# Patient Record
Sex: FEMALE | Race: WHITE | NOT HISPANIC OR LATINO | URBAN - METROPOLITAN AREA
[De-identification: names, ages, dates, MRNs, and addresses within clinical notes are randomized per-mention and may not be internally consistent; named-entity substitution may affect disease eponyms.]

---

## 2019-07-11 ENCOUNTER — HOSPITAL ENCOUNTER (OUTPATIENT)
Dept: GENERAL RADIOLOGY | Facility: HOSPITAL | Age: 24
Discharge: HOME | End: 2019-07-11
Attending: NURSE PRACTITIONER

## 2020-06-23 ENCOUNTER — HOSPITAL ENCOUNTER (OUTPATIENT)
Dept: GENERAL RADIOLOGY | Facility: HOSPITAL | Age: 25
Discharge: HOME | End: 2020-06-23
Attending: NURSE PRACTITIONER

## 2023-10-24 ENCOUNTER — TELEPHONE (OUTPATIENT)
Dept: OBSTETRICS AND GYNECOLOGY | Age: 28
End: 2023-10-24

## 2023-10-24 NOTE — TELEPHONE ENCOUNTER
Patient has her conformation appt on 11/1/23 with Dr. Romero, But she took her child to the doctor and her child has hand, foot, and mouth and they advised her to call us to see what she needs to do to protect herself.    Please advise.

## 2023-10-30 ENCOUNTER — OFFICE VISIT (OUTPATIENT)
Dept: OBSTETRICS AND GYNECOLOGY | Age: 28
End: 2023-10-30
Payer: COMMERCIAL

## 2023-10-30 VITALS — WEIGHT: 178 LBS | SYSTOLIC BLOOD PRESSURE: 110 MMHG | DIASTOLIC BLOOD PRESSURE: 70 MMHG

## 2023-10-30 DIAGNOSIS — O99.210 OBESITY IN PREGNANCY, ANTEPARTUM: ICD-10-CM

## 2023-10-30 DIAGNOSIS — Z3A.01 LESS THAN 8 WEEKS GESTATION OF PREGNANCY: Primary | ICD-10-CM

## 2023-10-30 DIAGNOSIS — Z13.89 SCREENING FOR BLOOD OR PROTEIN IN URINE: ICD-10-CM

## 2023-10-30 DIAGNOSIS — O21.9 NAUSEA AND VOMITING IN PREGNANCY: ICD-10-CM

## 2023-10-30 PROBLEM — Z98.891 HISTORY OF C-SECTION: Status: ACTIVE | Noted: 2023-10-30

## 2023-10-30 LAB
BILIRUB BLD-MCNC: NEGATIVE MG/DL
GLUCOSE UR STRIP-MCNC: NEGATIVE MG/DL
KETONES UR QL: NEGATIVE
LEUKOCYTE EST, POC: NEGATIVE
NITRITE UR-MCNC: NEGATIVE MG/ML
PH UR: 7 [PH] (ref 5–8)
PROT UR STRIP-MCNC: NEGATIVE MG/DL
RBC # UR STRIP: NEGATIVE /UL
SP GR UR: 1.02 (ref 1–1.03)
UROBILINOGEN UR QL: NORMAL

## 2023-10-30 NOTE — PROGRESS NOTES
"Subjective   Sherie Coleman is a 28 y.o. female is being seen today for   Chief Complaint   Patient presents with    Pregnancy Ultrasound     Pregnancy conformation    .    History of Present Illness    New patient to our office, she will be seeing Dr Romero  She is 6w4d and here for confirmation of pregnancy and ER follow up  She was seen in the ER last week for high fever and \"kidney\" pain  States they could not rule out a kidney infection and told her she had to be seen today  Since being seen in the ER her 2 year old was diagnosed with hand, foot and mouth and Sherie has also developed sore on her hands c/w HF&M  States her kidney pain was only for one day and she was dehydrated at that time  She was also concerned about some swollen lymph nodes in her neck  They recently moved here from [place]  She and her  have 2 children and he also has two children from a prior pregnancy  He is a carrier of sickle cell trait and so is her daughter    Previous csection x2- G1 \"got stuck\" and was an emergency csection and she had placenta previa with G2  Otherwise states her pregnancies were normal    No bleeding or cramping  This was a planned pregnancy      The following portions of the patient's history were reviewed and updated as appropriate: allergies, current medications, past family history, past medical history, past social history, past surgical history and problem list.    /70 (BP Location: Left arm, Patient Position: Sitting, Cuff Size: Adult)   Wt 80.7 kg (178 lb)         Review of Systems   Constitutional: Negative.    HENT: Negative.     Eyes: Negative.    Respiratory: Negative.     Cardiovascular: Negative.    Gastrointestinal:  Positive for nausea and vomiting.   Endocrine: Negative.    Genitourinary: Negative.  Negative for vaginal bleeding.   Musculoskeletal:  Positive for myalgias.   Skin:  Positive for rash.   Allergic/Immunologic: Negative.    Neurological: Negative.    Hematological: " Negative.    Psychiatric/Behavioral: Negative.         Objective   Physical Exam  Constitutional:       Appearance: She is well-developed.   Neck:      Comments: Cervical node tenderness  Cardiovascular:      Rate and Rhythm: Normal rate and regular rhythm.   Pulmonary:      Effort: Pulmonary effort is normal.   Abdominal:      General: Bowel sounds are normal. There is no distension.      Palpations: Abdomen is soft.      Tenderness: There is no abdominal tenderness.   Skin:     General: Skin is warm and dry.   Neurological:      Mental Status: She is alert and oriented to person, place, and time.   Psychiatric:         Behavior: Behavior normal.           Assessment & Plan   Diagnoses and all orders for this visit:    1. Less than 8 weeks gestation of pregnancy (Primary)  -     TSH  -     Hemoglobin A1c  -     Urine Culture - Urine, Urine, Clean Catch  -     Chlamydia trachomatis, Neisseria gonorrhoeae, PCR - Urine, Urine, Clean Catch  -     Varicella Zoster Antibody, IgG  -     OB Panel With HIV  -     Hemoglobinopathy Fractionation Cascade    2. Nausea and vomiting in pregnancy    3. Obesity in pregnancy, antepartum    Other orders  -     doxylamine-pyridoxine ER (BONJESTA) 20-20 MG tablet controlled-release tablet; Take 1 tablet by mouth 2 (Two) Times a Day.  Dispense: 60 tablet; Refill: 3      Ultrasound shows viable IUP, +cardiac, normal ovaries  Bonjesta for morning sickness sent  Discussed hand, foot and mouth.  There is nothing to do specific to pregnancy as far as monitoring or treatment. Just treat the symptoms, use good handwashing  If blisters persist or lymph node swelling she should follow up with her PCP  Early pregnancy counseling provided and New OB folder given  Patient is taking Prenatal vitamins  Problem list reviewed and updated  Reviewed routine prenatal care with the office to include but not limited to expected weight gain during pregnancy, Tylenol products are fine, avoid aspirin and  ibuprofen; Zika (travel restrictions/ok to use insect repellant); not to change cat litter; food restrictions; avoidance of alcohol, tobacco, drugs and saunas/hot tubs. Discussed that the COVID and Flu vaccine is safe and recommended in pregnancy.   SAB warnings reviewed  All questions answered  Labs sent, pap is up to date  Plan follow up initial OB visit with Dr Romero in 2 weeks           Total time spent today with Sherie  was 30-44 minutes (level 3).  Greater than 50% of the time was spent coordinating care, answering her questions and counseling regarding exercise in pregnancy, nutrition in pregnancy, weight gain in pregnancy, work and travel restrictions during pregnancy, list of OTC medications acceptable in pregnancy and call coverage groups and pathophysiology of her presenting problem along with plans for any diagnositc work-up and treatment.

## 2023-11-01 LAB
ABO GROUP BLD: NORMAL
BACTERIA UR CULT: NO GROWTH
BACTERIA UR CULT: NORMAL
BASOPHILS # BLD AUTO: 0 X10E3/UL (ref 0–0.2)
BASOPHILS NFR BLD AUTO: 0 %
BLD GP AB SCN SERPL QL: NEGATIVE
C TRACH RRNA SPEC QL NAA+PROBE: NEGATIVE
EOSINOPHIL # BLD AUTO: 0.1 X10E3/UL (ref 0–0.4)
EOSINOPHIL NFR BLD AUTO: 1 %
ERYTHROCYTE [DISTWIDTH] IN BLOOD BY AUTOMATED COUNT: 12.8 % (ref 11.7–15.4)
HBA1C MFR BLD: 5 % (ref 4.8–5.6)
HBV SURFACE AG SERPL QL IA: NEGATIVE
HCT VFR BLD AUTO: 41.1 % (ref 34–46.6)
HCV IGG SERPL QL IA: NON REACTIVE
HGB A MFR BLD ELPH: 97.5 % (ref 96.4–98.8)
HGB A2 MFR BLD ELPH: 2.5 % (ref 1.8–3.2)
HGB BLD-MCNC: 13.8 G/DL (ref 11.1–15.9)
HGB F MFR BLD ELPH: 0 % (ref 0–2)
HGB FRACT BLD-IMP: NORMAL
HGB S MFR BLD ELPH: 0 %
HIV 1+2 AB+HIV1 P24 AG SERPL QL IA: NON REACTIVE
IMM GRANULOCYTES # BLD AUTO: 0 X10E3/UL (ref 0–0.1)
IMM GRANULOCYTES NFR BLD AUTO: 0 %
LYMPHOCYTES # BLD AUTO: 2.9 X10E3/UL (ref 0.7–3.1)
LYMPHOCYTES NFR BLD AUTO: 32 %
MCH RBC QN AUTO: 28 PG (ref 26.6–33)
MCHC RBC AUTO-ENTMCNC: 33.6 G/DL (ref 31.5–35.7)
MCV RBC AUTO: 84 FL (ref 79–97)
MONOCYTES # BLD AUTO: 0.3 X10E3/UL (ref 0.1–0.9)
MONOCYTES NFR BLD AUTO: 4 %
MORPHOLOGY BLD-IMP: NORMAL
N GONORRHOEA RRNA SPEC QL NAA+PROBE: NEGATIVE
NEUTROPHILS # BLD AUTO: 5.8 X10E3/UL (ref 1.4–7)
NEUTROPHILS NFR BLD AUTO: 63 %
PLATELET # BLD AUTO: 324 X10E3/UL (ref 150–450)
RBC # BLD AUTO: 4.92 X10E6/UL (ref 3.77–5.28)
RH BLD: POSITIVE
RPR SER QL: NON REACTIVE
RUBV IGG SERPL IA-ACNC: 6.35 INDEX
TSH SERPL DL<=0.005 MIU/L-ACNC: 1.86 UIU/ML (ref 0.45–4.5)
VZV IGG SER IA-ACNC: 625 INDEX
WBC # BLD AUTO: 9.2 X10E3/UL (ref 3.4–10.8)

## 2023-11-15 ENCOUNTER — INITIAL PRENATAL (OUTPATIENT)
Dept: OBSTETRICS AND GYNECOLOGY | Age: 28
End: 2023-11-15
Payer: COMMERCIAL

## 2023-11-15 VITALS — WEIGHT: 181 LBS | DIASTOLIC BLOOD PRESSURE: 74 MMHG | SYSTOLIC BLOOD PRESSURE: 112 MMHG

## 2023-11-15 DIAGNOSIS — O99.210 OBESITY IN PREGNANCY, ANTEPARTUM: ICD-10-CM

## 2023-11-15 DIAGNOSIS — O21.9 NAUSEA AND VOMITING IN PREGNANCY: ICD-10-CM

## 2023-11-15 DIAGNOSIS — Z3A.09 9 WEEKS GESTATION OF PREGNANCY: ICD-10-CM

## 2023-11-15 DIAGNOSIS — K21.9 GASTROESOPHAGEAL REFLUX DISEASE WITHOUT ESOPHAGITIS: ICD-10-CM

## 2023-11-15 DIAGNOSIS — Z98.891 HISTORY OF C-SECTION: ICD-10-CM

## 2023-11-15 DIAGNOSIS — Z13.89 SCREENING FOR BLOOD OR PROTEIN IN URINE: Primary | ICD-10-CM

## 2023-11-15 LAB
BILIRUB BLD-MCNC: NEGATIVE MG/DL
GLUCOSE UR STRIP-MCNC: NEGATIVE MG/DL
KETONES UR QL: NEGATIVE
LEUKOCYTE EST, POC: NEGATIVE
NITRITE UR-MCNC: NEGATIVE MG/ML
PH UR: 6 [PH] (ref 5–8)
PROT UR STRIP-MCNC: NEGATIVE MG/DL
RBC # UR STRIP: NEGATIVE /UL
SP GR UR: 1.01 (ref 1–1.03)
UROBILINOGEN UR QL: NORMAL

## 2023-11-15 RX ORDER — FAMOTIDINE 20 MG/1
20 TABLET, FILM COATED ORAL DAILY
Qty: 30 TABLET | Refills: 1 | Status: SHIPPED | OUTPATIENT
Start: 2023-11-15 | End: 2024-11-14

## 2023-11-15 NOTE — PROGRESS NOTES
[facility]   Obstetrics and Gynecology   New Obstetric Visit    11/15/2023    Patient: Sherie Coleman          MR#:[id number]    History of Present Illness    Chief Complaint   Patient presents with    Routine Prenatal Visit     Cc: ob intake , pt c/o having discomfort upper abdomen area when having bowel movement experiencing cramping , nausea and fatigue , denies any vag spotting  , LMP 23 , last pap 23 neg        28 y.o. female  at 8w5d presents for NOB visit. She is doing well today. Taking prenatal vitamins.     Studies reviewed: TVUS from 23    ________________________________________  Patient Active Problem List   Diagnosis    History of     9 weeks gestation of pregnancy    Nausea and vomiting in pregnancy    Obesity in pregnancy, antepartum     OB History          3    Para   2    Term   1       1    AB        Living   2         SAB        IAB        Ectopic        Molar        Multiple        Live Births   2                  Social History:  Denies h/o gonorrhea, chlamydia, herpes, syphilis, HIV  Denies family history of birth defects and genetic disorders    Past Medical History:   Diagnosis Date    History of stomach ulcers     Placenta previa 2021     Past Surgical History:   Procedure Laterality Date     SECTION      WISDOM TOOTH EXTRACTION       Social History     Tobacco Use   Smoking Status Never   Smokeless Tobacco Not on file     Family History   Problem Relation Age of Onset    Breast cancer Paternal Grandmother     Breast cancer Maternal Aunt         Lung Cancer     Prior to Admission medications    Medication Sig Start Date End Date Taking? Authorizing Provider   doxylamine-pyridoxine ER (BONJESTA) 20-20 MG tablet controlled-release tablet Take 1 tablet by mouth 2 (Two) Times a Day. 10/30/23  Yes Mariela Miles APRN   prenatal vitamin (prenatal, CLASSIC, vitamin) tablet Take  by mouth Daily.   Yes Provider,  Shanon, MD   Cephalexin 500 MG tablet  10/29/23   Provider, Shanon, MD     ________________________________________    The following portions of the patient's history were reviewed and updated as appropriate: allergies, current medications, past family history, past medical history, past social history, past surgical history, and problem list.    Review of Systems   All other systems reviewed and are negative.           Objective     /74   Wt 82.1 kg (181 lb)   LMP 09/12/2023    BP Readings from Last 3 Encounters:   11/15/23 112/74   10/30/23 110/70   10/30/23 110/70      Wt Readings from Last 3 Encounters:   11/15/23 82.1 kg (181 lb)   10/30/23 80.7 kg (178 lb)   10/30/23 80.7 kg (178 lb)        BMI: There is no height or weight on file to calculate BMI.    Physical Exam  Vitals and nursing note reviewed. Exam conducted with a chaperone present.   Constitutional:       Appearance: Normal appearance.   HENT:      Head: Normocephalic and atraumatic.   Pulmonary:      Effort: Pulmonary effort is normal.   Chest:   Breasts:     Right: Normal. No mass or nipple discharge.      Left: Normal. No mass or nipple discharge.   Abdominal:      General: Abdomen is flat.      Palpations: Abdomen is soft.   Genitourinary:     Exam position: Lithotomy position.      Labia:         Right: No rash or lesion.         Left: No rash or lesion.       Vagina: Normal. No vaginal discharge or lesions.      Cervix: Normal. No lesion.      Uterus: Normal. Not tender.       Adnexa: Right adnexa normal and left adnexa normal.        Right: No mass or tenderness.          Left: No mass or tenderness.     Lymphadenopathy:      Upper Body:      Right upper body: No supraclavicular, axillary or pectoral adenopathy.      Left upper body: No supraclavicular, axillary or pectoral adenopathy.   Skin:     General: Skin is warm and dry.   Neurological:      Mental Status: She is alert and oriented to person, place, and time.    Psychiatric:         Mood and Affect: Mood normal.           Assessment:  28 y.o. female  at 8w5d presents for NOB visit.  Diagnoses and all orders for this visit:    1. Screening for blood or protein in urine (Primary)  -     POC Urinalysis Dipstick    2. Gastroesophageal reflux disease without esophagitis  -     famotidine (Pepcid) 20 MG tablet; Take 1 tablet by mouth Daily.  Dispense: 30 tablet; Refill: 1    3. 9 weeks gestation of pregnancy  Overview:  -PNL: IOB labs reviewed, A+, plan for GBS at 36wga  -Pap: Normal 2023 in [place]  -Genetics: Anatomy US at 20 weeks  -Imm: RI, VI, s/p  flu, tdap at 28 weeks   -Contraception: POPs   -Birthplan: CS/POPs/breast feeding   -Care coordination: accepts blood transfusions, no latex allergy, call schedule reviewed           4. History of   Overview:  CS for placenta previa last pregnancy  Will evaluate at 20 weeks  Repeat CS planned at 39 weeks unless otherwise indicated      5. Nausea and vomiting in pregnancy  Overview:  Improved, but may be having GERD symptoms  Famotidine sent to pharmacy 11/15/23      6. Obesity in pregnancy, antepartum  Overview:  CS planned at 39 weeks  A1c and TSH wnl  No  testing indicated, BMI not over 35              Plan:  4 weeks       Elina Romero MD  11/15/2023 10:41 EST

## 2023-11-21 ENCOUNTER — ROUTINE PRENATAL (OUTPATIENT)
Dept: OBSTETRICS AND GYNECOLOGY | Age: 28
End: 2023-11-21
Payer: COMMERCIAL

## 2023-11-21 ENCOUNTER — TELEPHONE (OUTPATIENT)
Dept: OBSTETRICS AND GYNECOLOGY | Age: 28
End: 2023-11-21
Payer: COMMERCIAL

## 2023-11-21 VITALS — SYSTOLIC BLOOD PRESSURE: 118 MMHG | DIASTOLIC BLOOD PRESSURE: 70 MMHG | WEIGHT: 178.4 LBS

## 2023-11-21 DIAGNOSIS — R10.9 CRAMPING AFFECTING PREGNANCY, ANTEPARTUM: ICD-10-CM

## 2023-11-21 DIAGNOSIS — Z3A.10 10 WEEKS GESTATION OF PREGNANCY: Primary | ICD-10-CM

## 2023-11-21 DIAGNOSIS — O26.899 CRAMPING AFFECTING PREGNANCY, ANTEPARTUM: ICD-10-CM

## 2023-11-21 PROCEDURE — 0502F SUBSEQUENT PRENATAL CARE: CPT

## 2023-11-21 NOTE — PROGRESS NOTES
Chief Complaint   Patient presents with    Routine Prenatal Visit     OB pt here for cramping. Last pap feb normal. Pt has no questions or concerns.          HPI: 28 y.o.  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 Intrauterine pregnancy at 10w0d           Diagnoses and all orders for this visit:    1. 10 weeks gestation of pregnancy (Primary)    2. Cramping affecting pregnancy, antepartum         labor was discussed.  Warnings were provided.  -----------------------  PLAN: bedside US with +FH movement SAB precautions reviewed   Return in about 4 weeks (around 2023).      Zuri Odonnell, APRN  2023 11:49 EST

## 2023-12-13 ENCOUNTER — ROUTINE PRENATAL (OUTPATIENT)
Dept: OBSTETRICS AND GYNECOLOGY | Age: 28
End: 2023-12-13
Payer: COMMERCIAL

## 2023-12-13 VITALS — WEIGHT: 181.4 LBS | SYSTOLIC BLOOD PRESSURE: 100 MMHG | DIASTOLIC BLOOD PRESSURE: 50 MMHG

## 2023-12-13 DIAGNOSIS — Z3A.13 13 WEEKS GESTATION OF PREGNANCY: Primary | ICD-10-CM

## 2023-12-13 DIAGNOSIS — O99.210 OBESITY IN PREGNANCY, ANTEPARTUM: ICD-10-CM

## 2023-12-13 DIAGNOSIS — R09.81 NASAL CONGESTION: ICD-10-CM

## 2023-12-13 DIAGNOSIS — O21.9 NAUSEA AND VOMITING IN PREGNANCY: ICD-10-CM

## 2023-12-13 DIAGNOSIS — Z98.891 HISTORY OF C-SECTION: ICD-10-CM

## 2023-12-13 LAB
BILIRUB BLD-MCNC: NEGATIVE MG/DL
GLUCOSE UR STRIP-MCNC: NEGATIVE MG/DL
KETONES UR QL: NEGATIVE
LEUKOCYTE EST, POC: ABNORMAL
NITRITE UR-MCNC: NEGATIVE MG/ML
PH UR: 6 [PH] (ref 5–8)
PROT UR STRIP-MCNC: NEGATIVE MG/DL
RBC # UR STRIP: NEGATIVE /UL
SP GR UR: 1.03 (ref 1–1.03)
UROBILINOGEN UR QL: ABNORMAL

## 2023-12-13 RX ORDER — FLUTICASONE PROPIONATE 50 MCG
1 SPRAY, SUSPENSION (ML) NASAL DAILY
Qty: 18.2 ML | Refills: 2 | Status: SHIPPED | OUTPATIENT
Start: 2023-12-13 | End: 2024-12-12

## 2023-12-13 RX ORDER — ONDANSETRON 4 MG/1
4 TABLET, ORALLY DISINTEGRATING ORAL EVERY 8 HOURS PRN
Qty: 30 TABLET | Refills: 3 | Status: SHIPPED | OUTPATIENT
Start: 2023-12-13

## 2023-12-13 NOTE — PROGRESS NOTES
Sherie Coleman, a 28 y.o.  at 13w1d, presents for OB follow-up.  She is doing well today.  Denies loss of fluid, vaginal bleeding, and contractions.  Endorses fetal movements. Continuing to have some N/V    Objective  BP Readings from Last 3 Encounters:   23 100/50   23 118/70   11/15/23 112/74      Wt Readings from Last 3 Encounters:   23 82.3 kg (181 lb 6.4 oz)   23 80.9 kg (178 lb 6.4 oz)   11/15/23 82.1 kg (181 lb)      Total weight gain this pregnancy: 1.996 kg (4 lb 6.4 oz)    General: Awake, alert, no apparent distress  Respiratory: No increased work of breathing  Abdomen: Fundus soft and nontender  Extremity: Nontender, no edema    A/P  Diagnoses and all orders for this visit:    1. 13 weeks gestation of pregnancy (Primary)  Overview:  -PNL: IOB labs reviewed, A+, plan for GBS at 36wga  -Pap: Normal 2023 in [place]  -Genetics: Anatomy US at 20 weeks  -Imm: RI, VI, s/p  flu, tdap at 28 weeks   -Contraception: POPs   -Birthplan: CS/POPs/breast feeding   -Care coordination: accepts blood transfusions, no latex allergy, call schedule reviewed         Orders:  -     POC Urinalysis Dipstick  -     Adria Panorama Prenatal Test: Chromosomes 13, 18, 21, X & Y: Triploidy 22Q.11.2 Deletion - Blood,; Future  -     Adria Horizon 14 (Pan-Ethnic Standard) - Blood,; Future    2. History of   Overview:  CS for placenta previa last pregnancy  Will evaluate at 20 weeks  Repeat CS planned at 39 weeks unless otherwise indicated      3. Nausea and vomiting in pregnancy  Overview:  Improved, but may be having GERD symptoms  Famotidine sent to pharmacy 11/15/23    Orders:  -     ondansetron ODT (ZOFRAN-ODT) 4 MG disintegrating tablet; Place 1 tablet on the tongue Every 8 (Eight) Hours As Needed for Nausea or Vomiting.  Dispense: 30 tablet; Refill: 3    4. Obesity in pregnancy, antepartum  Overview:  CS planned at 39 weeks  A1c and TSH wnl  No  testing indicated, BMI not over  35        5. Nasal congestion  -     fluticasone (FLONASE) 50 MCG/ACT nasal spray; 1 spray into the nostril(s) as directed by provider Daily.  Dispense: 18.2 mL; Refill: 2        Labor precautions reviewed  Return in about 4 weeks (around 1/10/2024).    Elina Romero MD

## 2023-12-17 ENCOUNTER — TELEPHONE (OUTPATIENT)
Dept: OBSTETRICS AND GYNECOLOGY | Age: 28
End: 2023-12-17
Payer: COMMERCIAL

## 2023-12-17 NOTE — LETTER
December 17, 2023     Patient: Sherie Coleman   YOB: 1995   Date of Visit: 12/17/2023       To Whom It May Concern:    It is my medical opinion that Sherie Coleman remain off work today due to severe headache in pregnancy.             Sincerely,        Antonia Hurtado MD    CC:   No Recipients

## 2023-12-17 NOTE — TELEPHONE ENCOUNTER
Pt called office   Still with headache after coming home from work, has taken limit of tylenol and tried excedrin migraine  Advise a one time dose of ibuprofen 600 mg  If no help recommend to ER for evaluation  Recommend not working today due to headache, will place note in chart for work

## 2023-12-18 ENCOUNTER — TELEPHONE (OUTPATIENT)
Dept: OBSTETRICS AND GYNECOLOGY | Age: 28
End: 2023-12-18

## 2023-12-18 NOTE — TELEPHONE ENCOUNTER
Hub staff attempted to follow warm transfer process and was unsuccessful     Caller: Sherie Coleman    Relationship to patient: Self    Best call back number: [phone]    Patient is needing: PT SPOKE WITH ON CALL  OVER WEEKEND ABOUT A MIGRAINE SHE HAD AND SHE STATED SHE WAS TOLD TO CALL AND SCHEDULE AN APPOINTMENT TO BE SEEN SOONER THAN THE CURRENT APPOINTMENT SHE HAS. PLEASE CONTACT PT TO SCHEDULE.     PT CAN BE REACHED ANYTIME

## 2023-12-20 ENCOUNTER — ROUTINE PRENATAL (OUTPATIENT)
Dept: OBSTETRICS AND GYNECOLOGY | Age: 28
End: 2023-12-20
Payer: COMMERCIAL

## 2023-12-20 VITALS — WEIGHT: 179.6 LBS | DIASTOLIC BLOOD PRESSURE: 58 MMHG | SYSTOLIC BLOOD PRESSURE: 110 MMHG

## 2023-12-20 DIAGNOSIS — O99.210 OBESITY IN PREGNANCY, ANTEPARTUM: ICD-10-CM

## 2023-12-20 DIAGNOSIS — Z3A.14 14 WEEKS GESTATION OF PREGNANCY: Primary | ICD-10-CM

## 2023-12-20 DIAGNOSIS — Z98.891 HISTORY OF C-SECTION: ICD-10-CM

## 2023-12-20 DIAGNOSIS — G43.709 CHRONIC MIGRAINE WITHOUT AURA WITHOUT STATUS MIGRAINOSUS, NOT INTRACTABLE: ICD-10-CM

## 2023-12-20 DIAGNOSIS — O21.9 NAUSEA AND VOMITING IN PREGNANCY: ICD-10-CM

## 2023-12-20 LAB
BILIRUB BLD-MCNC: NEGATIVE MG/DL
GLUCOSE UR STRIP-MCNC: NEGATIVE MG/DL
KETONES UR QL: NEGATIVE
LEUKOCYTE EST, POC: NEGATIVE
NITRITE UR-MCNC: NEGATIVE MG/ML
PH UR: 8.5 [PH] (ref 5–8)
PROT UR STRIP-MCNC: NEGATIVE MG/DL
RBC # UR STRIP: NEGATIVE /UL
SP GR UR: 1.02 (ref 1–1.03)
UROBILINOGEN UR QL: ABNORMAL

## 2023-12-20 RX ORDER — BUTALBITAL, ACETAMINOPHEN AND CAFFEINE 300; 40; 50 MG/1; MG/1; MG/1
1 CAPSULE ORAL EVERY 4 HOURS PRN
Qty: 20 CAPSULE | Refills: 0 | Status: SHIPPED | OUTPATIENT
Start: 2023-12-20

## 2023-12-20 NOTE — PROGRESS NOTES
Sherie Coleman, a 28 y.o.  at 14w1d, presents for OB follow-up.  She is doing well today.  Denies loss of fluid, vaginal bleeding, and contractions.  Endorses fetal movements.    Objective  BP Readings from Last 3 Encounters:   23 110/58   23 100/50   23 118/70      Wt Readings from Last 3 Encounters:   23 81.5 kg (179 lb 9.6 oz)   23 82.3 kg (181 lb 6.4 oz)   23 80.9 kg (178 lb 6.4 oz)      Total weight gain this pregnancy: 1.179 kg (2 lb 9.6 oz)    General: Awake, alert, no apparent distress  Respiratory: No increased work of breathing  Abdomen: Fundus soft and nontender  Extremity: Nontender, no edema    A/P  Diagnoses and all orders for this visit:    1. 14 weeks gestation of pregnancy (Primary)  Overview:  -PNL: IOB labs reviewed, A+, plan for GBS at 36wga  -Pap: Normal 2023 in [place]  -Genetics: Anatomy US at 20 weeks  -Imm: RI, VI, s/p  flu, tdap at 28 weeks   -Contraception: POPs   -Birthplan: CS/POPs/breast feeding   -Care coordination: accepts blood transfusions, no latex allergy, call schedule reviewed         Orders:  -     POC Urinalysis Dipstick    2. History of   Overview:  CS for placenta previa last pregnancy  Will evaluate at 20 weeks  Repeat CS planned at 39 weeks unless otherwise indicated      3. Nausea and vomiting in pregnancy  Overview:  Improved, but may be having GERD symptoms  Famotidine sent to pharmacy 11/15/23      4. Obesity in pregnancy, antepartum  Overview:  CS planned at 39 weeks  A1c and TSH wnl  No  testing indicated, BMI not over 35        5. Chronic migraine without aura without status migrainosus, not intractable  Overview:  - New onset headaches since pregnancy  - Starts as pressure in the front of head and progresses to severe pain. -   - Has had episode of nausea with headaches  - Not responsive to tylenol  - Will try short course of fiorcet   - Referral to neurology > possibly consider imaging pending  neuro recs  - May consider triptan if not improving     Orders:  -     butalbital-acetaminophen-caffeine (Fioricet) -40 MG capsule capsule; Take 1 capsule by mouth Every 4 (Four) Hours As Needed (for headache).  Dispense: 20 capsule; Refill: 0  -     Ambulatory Referral to Neurology        Labor precautions reviewed  No follow-ups on file.    Elina Romero MD

## 2023-12-28 ENCOUNTER — TELEPHONE (OUTPATIENT)
Dept: OBSTETRICS AND GYNECOLOGY | Age: 28
End: 2023-12-28
Payer: COMMERCIAL

## 2023-12-29 DIAGNOSIS — G43.709 CHRONIC MIGRAINE WITHOUT AURA WITHOUT STATUS MIGRAINOSUS, NOT INTRACTABLE: ICD-10-CM

## 2023-12-29 RX ORDER — BUTALBITAL, ACETAMINOPHEN AND CAFFEINE 300; 40; 50 MG/1; MG/1; MG/1
1 CAPSULE ORAL EVERY 4 HOURS PRN
Qty: 20 CAPSULE | Refills: 0 | Status: CANCELLED | OUTPATIENT
Start: 2023-12-29

## 2024-01-08 DIAGNOSIS — O26.899 NEW ONSET OF HEADACHE IN PREGNANT PATIENT: Primary | ICD-10-CM

## 2024-01-08 DIAGNOSIS — R51.9 NEW ONSET OF HEADACHE IN PREGNANT PATIENT: Primary | ICD-10-CM

## 2024-01-08 DIAGNOSIS — G43.709 CHRONIC MIGRAINE WITHOUT AURA WITHOUT STATUS MIGRAINOSUS, NOT INTRACTABLE: Primary | ICD-10-CM

## 2024-01-08 RX ORDER — PROCHLORPERAZINE MALEATE 10 MG
10 TABLET ORAL EVERY 6 HOURS PRN
Qty: 10 TABLET | Refills: 3 | Status: SHIPPED | OUTPATIENT
Start: 2024-01-08

## 2024-01-08 RX ORDER — MAGNESIUM OXIDE 400 MG/1
400 TABLET ORAL DAILY
Qty: 30 TABLET | Refills: 5 | Status: SHIPPED | OUTPATIENT
Start: 2024-01-08

## 2024-01-10 ENCOUNTER — ROUTINE PRENATAL (OUTPATIENT)
Dept: OBSTETRICS AND GYNECOLOGY | Age: 29
End: 2024-01-10
Payer: COMMERCIAL

## 2024-01-10 VITALS — DIASTOLIC BLOOD PRESSURE: 60 MMHG | WEIGHT: 183.6 LBS | SYSTOLIC BLOOD PRESSURE: 110 MMHG

## 2024-01-10 DIAGNOSIS — O99.210 OBESITY IN PREGNANCY, ANTEPARTUM: ICD-10-CM

## 2024-01-10 DIAGNOSIS — Z98.891 HISTORY OF C-SECTION: ICD-10-CM

## 2024-01-10 DIAGNOSIS — G43.709 CHRONIC MIGRAINE WITHOUT AURA WITHOUT STATUS MIGRAINOSUS, NOT INTRACTABLE: ICD-10-CM

## 2024-01-10 DIAGNOSIS — O21.9 NAUSEA AND VOMITING IN PREGNANCY: ICD-10-CM

## 2024-01-10 DIAGNOSIS — Z3A.17 17 WEEKS GESTATION OF PREGNANCY: Primary | ICD-10-CM

## 2024-01-10 LAB
BILIRUB BLD-MCNC: NEGATIVE MG/DL
GLUCOSE UR STRIP-MCNC: NEGATIVE MG/DL
KETONES UR QL: NEGATIVE
LEUKOCYTE EST, POC: NEGATIVE
NITRITE UR-MCNC: NEGATIVE MG/ML
PH UR: 7.5 [PH] (ref 5–8)
PROT UR STRIP-MCNC: NEGATIVE MG/DL
RBC # UR STRIP: NEGATIVE /UL
SP GR UR: 1.02 (ref 1–1.03)
UROBILINOGEN UR QL: NORMAL

## 2024-01-10 PROCEDURE — 0502F SUBSEQUENT PRENATAL CARE: CPT | Performed by: STUDENT IN AN ORGANIZED HEALTH CARE EDUCATION/TRAINING PROGRAM

## 2024-01-10 NOTE — PROGRESS NOTES
Sherie Coleman, a 28 y.o.  at 17w1d, presents for OB follow-up.  She is doing well today.  Denies loss of fluid, vaginal bleeding, and contractions.  Endorses fetal movements.    Objective  BP Readings from Last 3 Encounters:   01/10/24 110/60   23 110/58   23 100/50      Wt Readings from Last 3 Encounters:   01/10/24 83.3 kg (183 lb 9.6 oz)   23 81.5 kg (179 lb 9.6 oz)   23 82.3 kg (181 lb 6.4 oz)      Total weight gain this pregnancy: 2.994 kg (6 lb 9.6 oz)    General: Awake, alert, no apparent distress  Respiratory: No increased work of breathing  Abdomen: Fundus soft and nontender  Extremity: Nontender, no edema    A/P  Diagnoses and all orders for this visit:    1. 17 weeks gestation of pregnancy (Primary)  Overview:  -PNL: IOB labs reviewed, A+, plan for GBS at 36wga  -Pap: Normal 2023 in [place]  -Genetics: Anatomy US at 20 weeks  -Imm: RI, VI, s/p  flu, tdap at 28 weeks   -Contraception: POPs   -Birthplan: CS/POPs/breast feeding   -Care coordination: accepts blood transfusions, no latex allergy, call schedule reviewed         Orders:  -     POC Urinalysis Dipstick    2. History of   Overview:  CS for placenta previa last pregnancy  Will evaluate at 20 weeks  Repeat CS planned at 39 weeks unless otherwise indicated      3. Nausea and vomiting in pregnancy  Overview:  Improved, but may be having GERD symptoms  Famotidine sent to pharmacy 11/15/23      4. Obesity in pregnancy, antepartum  Overview:  CS planned at 39 weeks  A1c and TSH wnl  No  testing indicated, BMI not over 35        5. Chronic migraine without aura without status migrainosus, not intractable  Overview:  - New onset headaches since pregnancy  - Starts as pressure in the front of head and progresses to severe pain. -   - Has had episode of nausea with headaches  - Not responsive to tylenol  - Will try short course of fiorcet   - Referral to neurology > possibly consider imaging pending  neuro recs  - May consider triptan if not improving           Labor precautions reviewed  No follow-ups on file.    Elina Romero MD

## 2024-01-18 ENCOUNTER — TELEPHONE (OUTPATIENT)
Dept: OBSTETRICS AND GYNECOLOGY | Age: 29
End: 2024-01-18
Payer: COMMERCIAL

## 2024-01-18 ENCOUNTER — HOSPITAL ENCOUNTER (EMERGENCY)
Facility: HOSPITAL | Age: 29
Discharge: HOME | End: 2024-01-18
Attending: STUDENT IN AN ORGANIZED HEALTH CARE EDUCATION/TRAINING PROGRAM | Admitting: OBSTETRICS & GYNECOLOGY
Payer: COMMERCIAL

## 2024-01-18 VITALS
TEMPERATURE: 97.9 F | HEART RATE: 103 BPM | DIASTOLIC BLOOD PRESSURE: 70 MMHG | RESPIRATION RATE: 16 BRPM | OXYGEN SATURATION: 99 % | WEIGHT: 188 LBS | SYSTOLIC BLOOD PRESSURE: 129 MMHG | HEIGHT: 62 IN | BODY MASS INDEX: 34.6 KG/M2

## 2024-01-18 LAB
BILIRUB UR QL STRIP: NEGATIVE
CLARITY UR: CLEAR
COLOR UR: YELLOW
GLUCOSE UR STRIP-MCNC: NEGATIVE MG/DL
HGB UR QL STRIP.AUTO: NEGATIVE
KETONES UR QL STRIP: NEGATIVE
LEUKOCYTE ESTERASE UR QL STRIP.AUTO: NEGATIVE
NITRITE UR QL STRIP: NEGATIVE
PH UR STRIP.AUTO: 6 [PH] (ref 5–8)
PROT UR QL STRIP: NEGATIVE
SP GR UR STRIP: 1.02 (ref 1–1.03)
UROBILINOGEN UR QL STRIP: NORMAL

## 2024-01-18 PROCEDURE — 87086 URINE CULTURE/COLONY COUNT: CPT | Performed by: OBSTETRICS & GYNECOLOGY

## 2024-01-18 PROCEDURE — 81003 URINALYSIS AUTO W/O SCOPE: CPT | Performed by: OBSTETRICS & GYNECOLOGY

## 2024-01-18 PROCEDURE — 99283 EMERGENCY DEPT VISIT LOW MDM: CPT | Performed by: OBSTETRICS & GYNECOLOGY

## 2024-01-18 NOTE — OBED NOTES
LORE Note OB    Patient Name: Sherie Coleman  YOB: 1995  MRN: [id number]  Admission Date: 2024  2:15 PM  Date of Service: 2024    Chief Complaint: Abdominal Cramping (Pt reports she came to work this AM and has had cramping that gets worse with activity. Describes pain as sharp and burning and radiates across ABD. More pronounced on R side./Rates pain a /10. Denies VB. LOF)        Subjective     Sherie Coleman is a 28 y.o. female  at 18w2d with Estimated Date of Delivery: 24 who presents with the chief complaint listed above. Pt has a 1.5 year old and a 2.5 year old and works as a nurse in hospice. She denies F/C/N/V/change in appetite.     She sees Elina Romero, * for her prenatal care. Her pregnancy has been complicated by:  rior  x 2, Has, obese, prior previa with last CS    She describes fetal movement as normal.  She denies rupture of membranes.  She denies vaginal bleeding. She is not feeling contractions.        Objective   Patient Active Problem List    Diagnosis     Chronic migraine without aura without status migrainosus, not intractable [G43.709]     History of  [Z98.891]     17 weeks gestation of pregnancy [Z3A.17]     Nausea and vomiting in pregnancy [O21.9]     Obesity in pregnancy, antepartum [O99.210]         OB History    Para Term  AB Living   3 2 1 1 0 2   SAB IAB Ectopic Molar Multiple Live Births   0 0 0 0 0 2      # Outcome Date GA Lbr Warner/2nd Weight Sex Delivery Anes PTL Lv   3 Current            2  22 35w5d  2665 g (5 lb 14 oz) F CS-LTranv Spinal  NESTOR      Complications: Placenta Previa      Name: edi   1 Term 2021 39w0d  4111 g (9 lb 1 oz) M CS-LTranv EPI  NESTOR      Complications: Fetal Intolerance, Shoulder Dystocia      Name: nathan        Past Medical History:   Diagnosis Date    History of stomach ulcers     Placenta previa 2021       Past Surgical History:   Procedure Laterality Date      SECTION      WISDOM TOOTH EXTRACTION         No current facility-administered medications on file prior to encounter.     Current Outpatient Medications on File Prior to Encounter   Medication Sig Dispense Refill    fluticasone (FLONASE) 50 MCG/ACT nasal spray 1 spray into the nostril(s) as directed by provider Daily. 18.2 mL 2    magnesium oxide (MAG-OX) 400 MG tablet Take 1 tablet by mouth Daily. 30 tablet 5    prenatal vitamin (prenatal, CLASSIC, vitamin) tablet Take  by mouth Daily.      prochlorperazine (COMPAZINE) 10 MG tablet Take 1 tablet by mouth Every 6 (Six) Hours As Needed for Nausea or Vomiting. 10 tablet 3    butalbital-acetaminophen-caffeine (Fioricet) -40 MG capsule capsule Take 1 capsule by mouth Every 4 (Four) Hours As Needed (for headache). 20 capsule 0    doxylamine-pyridoxine ER (BONJESTA) 20-20 MG tablet controlled-release tablet Take 1 tablet by mouth 2 (Two) Times a Day. 60 tablet 3    famotidine (Pepcid) 20 MG tablet Take 1 tablet by mouth Daily. 30 tablet 1    ondansetron ODT (ZOFRAN-ODT) 4 MG disintegrating tablet Place 1 tablet on the tongue Every 8 (Eight) Hours As Needed for Nausea or Vomiting. 30 tablet 3       Allergies   Allergen Reactions    Fd&C Red #3 (Erythrosine Sodium) Other (See Comments)     Other reaction(s): GI Reaction   Also pink dye in soaps: fever, vomiting, hives. Pink 5    Other Hives, Itching, Other (See Comments) and Rash     Pink five -dye, oxyclean, splenda    Mouth sores from splenda    Also pink dye in soaps: fever, vomiting, hives. Pink 5    Soap & Cleansers Other (See Comments)     Skin reaction to oxy clean    Other reaction(s): Dermatologic Reaction   Skin reaction to oxy clean    Alitraq Other (See Comments)     Mouth sores from splenda    Latex Rash       Family History   Problem Relation Age of Onset    Breast cancer Paternal Grandmother     Breast cancer Maternal Aunt         Lung Cancer       Social History     Socioeconomic History     Marital status: Single   Tobacco Use    Smoking status: Never   Substance and Sexual Activity    Alcohol use: Not Currently    Drug use: Never    Sexual activity: Yes           Review of Systems   Constitutional:  Negative for chills and fever.   HENT: Negative.     Eyes:  Negative for photophobia and visual disturbance.   Respiratory:  Negative for shortness of breath.    Cardiovascular:  Negative for chest pain.   Gastrointestinal:  Positive for abdominal pain. Negative for nausea.   Psychiatric/Behavioral:  The patient is not nervous/anxious.           PHYSICAL EXAM:      VITAL SIGNS:  Vitals:    01/18/24 1447 01/18/24 1450 01/18/24 1455 01/18/24 1500   BP: 129/66 136/79  129/70   BP Location:       Patient Position:       Pulse: 107 110 101 103   Resp:       Temp:       TempSrc:       SpO2:  98% 98% 99%   Weight:       Height:                FHT'S:                       Baseline:         Fetal HR Baseline: normal range                   Beats/min:       Fetal HR (beats/min): 150                                             PHYSICAL EXAM:      General: well developed; well nourished  no acute distress   Heart: Not performed.   Lungs   breathing is unlabored   Abdomen: Gravid and non tender     Extremities: trace edema, DTRs 1 plus, no clonus       Cervix: Per RN  Cervical Dilation (cm): 0  Cervical Effacement: 20%  Cervical Consistency: medium  Cervical Position: mid-position     Contractions:   none                    LABS AND TESTING ORDERED:  Uterine and fetal monitoring  Urinalysis      LAB RESULTS:    Recent Results (from the past 24 hour(s))   Urinalysis With Culture If Indicated - Urine, Clean Catch    Collection Time: 01/18/24  2:35 PM    Specimen: Urine, Clean Catch   Result Value Ref Range    Color, UA Yellow Yellow, Straw    Appearance, UA Clear Clear    pH, UA 6.0 5.0 - 8.0    Specific Gravity, UA 1.020 1.005 - 1.030    Glucose, UA Negative Negative    Ketones, UA Negative Negative    Bilirubin, UA  "Negative Negative    Blood, UA Negative Negative    Protein, UA Negative Negative    Leuk Esterase, UA Negative Negative    Nitrite, UA Negative Negative    Urobilinogen, UA 0.2 E.U./dL 0.2 - 1.0 E.U./dL       Lab Results   Component Value Date    ABO A 10/31/2023    RH Positive 10/31/2023       No results found for: \"STREPGPB\"              External Prenatal Results       Pregnancy Outside Results - Transcribed From Office Records - See Scanned Records For Details       Test Value Date Time    ABO  A  10/31/23 1335    Rh  Positive  10/31/23 1335    Antibody Screen  Negative  10/31/23 1335    Varicella IgG  625 index 10/31/23 1335    Rubella  6.35 index 10/31/23 1335    Hgb  13.8 g/dL 10/31/23 1335    Hct  41.1 % 10/31/23 1335    Glucose Fasting GTT       Glucose Tolerance Test 1 hour       Glucose Tolerance Test 3 hour       Gonorrhea (discrete)  Negative  10/30/23 1515    Chlamydia (discrete)  Negative  10/30/23 1515    RPR  Non Reactive  10/31/23 1335    VDRL       Syphilis Antibody ^ <0.2 AI 21 1113    HBsAg  Negative  10/31/23 1335    Herpes Simplex Virus PCR       Herpes Simplex VIrus Culture       HIV  Non Reactive  10/31/23 1335    Hep C RNA Quant PCR       Hep C Antibody  Non Reactive  10/31/23 1335    AFP       Group B Strep       GBS Susceptibility to Clindamycin       GBS Susceptibility to Erythromycin       Fetal Fibronectin       Genetic Testing, Maternal Blood                 Drug Screening       Test Value Date Time    Urine Drug Screen       Amphetamine Screen       Barbiturate Screen       Benzodiazepine Screen       Methadone Screen       Phencyclidine Screen       Opiates Screen       THC Screen       Cocaine Screen       Propoxyphene Screen       Buprenorphine Screen       Methamphetamine Screen       Oxycodone Screen       Tricyclic Antidepressants Screen                 Legend    ^: Historical                              Impression:   @ 18w2d .  Final Diagnosis: probable round " ligament pain, OB exam benign    Plan:  1. Discharge to home.    2. Plan of care has been reviewed with patient along with risks, benefits of treatment.   All questions have been answered. Call health care provider for any further concerns and keep office appointments.  3. Comfort measures, miscarriage precautions, off work for today      I discussed the patients findings and my recommendations with patient and nursing staff      Nan Ardno MD  1/18/2024  15:35 EST

## 2024-01-18 NOTE — NURSING NOTE
Discharge instructions reviewed with patient. Discussed fetal kick counts, LOF, and VB. Informed to keep all regularly scheduled appointments. Told patient to call MD or return back to L&D with any questions and concerns. Patient verbalizes understanding.

## 2024-01-19 LAB — BACTERIA SPEC AEROBE CULT: NORMAL

## 2024-01-28 ENCOUNTER — HOSPITAL ENCOUNTER (OUTPATIENT)
Dept: MRI IMAGING | Facility: HOSPITAL | Age: 29
Discharge: HOME | End: 2024-01-28
Admitting: STUDENT IN AN ORGANIZED HEALTH CARE EDUCATION/TRAINING PROGRAM
Payer: COMMERCIAL

## 2024-01-28 DIAGNOSIS — R51.9 NEW ONSET OF HEADACHE IN PREGNANT PATIENT: ICD-10-CM

## 2024-01-28 DIAGNOSIS — O26.899 NEW ONSET OF HEADACHE IN PREGNANT PATIENT: ICD-10-CM

## 2024-01-28 PROCEDURE — 70551 MRI BRAIN STEM W/O DYE: CPT

## 2024-02-01 ENCOUNTER — ROUTINE PRENATAL (OUTPATIENT)
Dept: OBSTETRICS AND GYNECOLOGY | Age: 29
End: 2024-02-01
Payer: COMMERCIAL

## 2024-02-01 VITALS — SYSTOLIC BLOOD PRESSURE: 106 MMHG | WEIGHT: 186.4 LBS | BODY MASS INDEX: 34.09 KG/M2 | DIASTOLIC BLOOD PRESSURE: 60 MMHG

## 2024-02-01 DIAGNOSIS — Z3A.20 20 WEEKS GESTATION OF PREGNANCY: ICD-10-CM

## 2024-02-01 DIAGNOSIS — Z98.891 HISTORY OF C-SECTION: ICD-10-CM

## 2024-02-01 DIAGNOSIS — G43.709 CHRONIC MIGRAINE WITHOUT AURA WITHOUT STATUS MIGRAINOSUS, NOT INTRACTABLE: Primary | ICD-10-CM

## 2024-02-01 DIAGNOSIS — Z13.89 SCREENING FOR BLOOD OR PROTEIN IN URINE: ICD-10-CM

## 2024-02-01 DIAGNOSIS — O99.210 OBESITY IN PREGNANCY, ANTEPARTUM: ICD-10-CM

## 2024-02-01 PROBLEM — O21.9 NAUSEA AND VOMITING IN PREGNANCY: Status: RESOLVED | Noted: 2023-10-30 | Resolved: 2024-02-01

## 2024-02-01 NOTE — PROGRESS NOTES
Sherie Coleman, a 28 y.o.  at 20w2d, presents for OB follow-up.  She is doing well today.  Denies loss of fluid, vaginal bleeding, and contractions.  Endorses fetal movements.    Objective  BP Readings from Last 3 Encounters:   24 106/60   24 129/70   01/10/24 110/60      Wt Readings from Last 3 Encounters:   24 84.6 kg (186 lb 6.4 oz)   24 85.3 kg (188 lb)   01/10/24 83.3 kg (183 lb 9.6 oz)      Total weight gain this pregnancy: 4.264 kg (9 lb 6.4 oz)    General: Awake, alert, no apparent distress  Respiratory: No increased work of breathing  Abdomen: Fundus soft and nontender  Extremity: Nontender, no edema    A/P  Diagnoses and all orders for this visit:    1. Chronic migraine without aura without status migrainosus, not intractable (Primary)  Overview:  - New onset headaches since pregnancy  - Starts as pressure in the front of head and progresses to severe pain. -   - Has had episode of nausea with headaches  - Not responsive to tylenol  - Will try short course of fiorcet   - Referral to neurology > possibly consider imaging pending neuro recs > neuro appt 2/15  - May consider triptan if not improving   - MRI ordered 24> normal      2. Obesity in pregnancy, antepartum  Overview:  CS planned at 39 weeks  A1c and TSH wnl  No  testing indicated, BMI not over 35        3. History of   Overview:  CS for placenta previa last pregnancy  Will evaluate at 20 weeks  Repeat CS planned at 39 weeks unless otherwise indicated      4. 20 weeks gestation of pregnancy  Overview:  -PNL: IOB labs reviewed, A+, plan for GBS at 36wga  -Pap: Normal 2023 in [place]  -Genetics: NIPT low risk, Anatomy US at 20 weeks  -Imm: RI, VI, s/p  flu, tdap at 28 weeks   -Contraception: POPs   -Birthplan: CS/POPs/breast feeding   -Care coordination: accepts blood transfusions, no latex allergy, call schedule reviewed               Labor precautions reviewed  No follow-ups on  file.    Elina Romero MD

## 2024-03-06 ENCOUNTER — ROUTINE PRENATAL (OUTPATIENT)
Dept: OBSTETRICS AND GYNECOLOGY | Age: 29
End: 2024-03-06
Payer: COMMERCIAL

## 2024-03-06 VITALS — SYSTOLIC BLOOD PRESSURE: 116 MMHG | WEIGHT: 181.4 LBS | BODY MASS INDEX: 33.18 KG/M2 | DIASTOLIC BLOOD PRESSURE: 62 MMHG

## 2024-03-06 DIAGNOSIS — G43.709 CHRONIC MIGRAINE WITHOUT AURA WITHOUT STATUS MIGRAINOSUS, NOT INTRACTABLE: ICD-10-CM

## 2024-03-06 DIAGNOSIS — O99.210 OBESITY IN PREGNANCY, ANTEPARTUM: ICD-10-CM

## 2024-03-06 DIAGNOSIS — Z98.891 HISTORY OF C-SECTION: ICD-10-CM

## 2024-03-06 DIAGNOSIS — Z3A.25 25 WEEKS GESTATION OF PREGNANCY: Primary | ICD-10-CM

## 2024-03-06 LAB
BILIRUB BLD-MCNC: NEGATIVE MG/DL
CLARITY, POC: CLEAR
COLOR UR: YELLOW
GLUCOSE UR STRIP-MCNC: NEGATIVE MG/DL
KETONES UR QL: NEGATIVE
LEUKOCYTE EST, POC: NEGATIVE
NITRITE UR-MCNC: NEGATIVE MG/ML
PH UR: 6 [PH] (ref 5–8)
PROT UR STRIP-MCNC: NEGATIVE MG/DL
RBC # UR STRIP: NEGATIVE /UL
SP GR UR: 1.02 (ref 1–1.03)
UROBILINOGEN UR QL: NORMAL

## 2024-03-06 NOTE — PROGRESS NOTES
Sherie Coleman, a 28 y.o.  at 25w1d, presents for OB follow-up.  She is doing well today.  Denies loss of fluid, vaginal bleeding, and contractions.  Feels decreased fetal movement today, NST reassuring in office.     Objective  BP Readings from Last 3 Encounters:   24 116/62   24 106/60   24 129/70      Wt Readings from Last 3 Encounters:   24 82.3 kg (181 lb 6.4 oz)   24 84.6 kg (186 lb 6.4 oz)   24 85.3 kg (188 lb)      Total weight gain this pregnancy: 1.996 kg (4 lb 6.4 oz)    General: Awake, alert, no apparent distress  Respiratory: No increased work of breathing  Abdomen: Fundus soft and nontender  Extremity: Nontender, no edema    A/P  Diagnoses and all orders for this visit:    1. 25 weeks gestation of pregnancy (Primary)  Overview:  -PNL: IOB labs reviewed, A+, plan for GBS at 36wga  -Pap: Normal 2023 in [place]  -Genetics: NIPT low risk, Anatomy US at 20 weeks  -Imm: RI, VI, s/p  flu, tdap at 28 weeks   -Contraception: POPs   -Birthplan: CS/POPs/breast feeding   -Care coordination: accepts blood transfusions, no latex allergy, call schedule reviewed         Orders:  -     POC Urinalysis Dipstick  -     Gestational Screen 1 Hr ([facility])  -     CBC (No Diff)  -     RPR    2. Obesity in pregnancy, antepartum  Overview:  CS planned at 39 weeks  A1c and TSH wnl  No  testing indicated, BMI not over 35        3. History of   Overview:  CS for placenta previa last pregnancy  Will evaluate at 20 weeks  Repeat CS planned at 39 weeks unless otherwise indicated      4. Chronic migraine without aura without status migrainosus, not intractable  Overview:  - New onset headaches since pregnancy  - Starts as pressure in the front of head and progresses to severe pain. -   - Has had episode of nausea with headaches  - Not responsive to tylenol  - Will try short course of fiorcet   - Referral to neurology > possibly consider imaging pending neuro recs >  neuro appt 2/15  - May consider triptan if not improving   - MRI ordered 1/8/24> normal  - Significantly improved as of 2/1/24          Labor precautions reviewed  No follow-ups on file.    Elina Romero MD

## 2024-03-07 LAB
ERYTHROCYTE [DISTWIDTH] IN BLOOD BY AUTOMATED COUNT: 13 % (ref 12.3–15.4)
GLUCOSE 1H P 50 G GLC PO SERPL-MCNC: 131 MG/DL (ref 65–139)
HCT VFR BLD AUTO: 38.3 % (ref 34–46.6)
HGB BLD-MCNC: 13.1 G/DL (ref 12–15.9)
MCH RBC QN AUTO: 28.9 PG (ref 26.6–33)
MCHC RBC AUTO-ENTMCNC: 34.2 G/DL (ref 31.5–35.7)
MCV RBC AUTO: 84.5 FL (ref 79–97)
PLATELET # BLD AUTO: 215 10*3/MM3 (ref 140–450)
RBC # BLD AUTO: 4.53 10*6/MM3 (ref 3.77–5.28)
RPR SER QL: NON REACTIVE
WBC # BLD AUTO: 12.35 10*3/MM3 (ref 3.4–10.8)

## 2024-03-08 ENCOUNTER — HOSPITAL ENCOUNTER (OUTPATIENT)
Facility: HOSPITAL | Age: 29
Discharge: HOME | End: 2024-03-08
Attending: OBSTETRICS & GYNECOLOGY | Admitting: OBSTETRICS & GYNECOLOGY
Payer: MEDICAID

## 2024-03-08 VITALS
DIASTOLIC BLOOD PRESSURE: 63 MMHG | HEART RATE: 110 BPM | RESPIRATION RATE: 18 BRPM | SYSTOLIC BLOOD PRESSURE: 117 MMHG | OXYGEN SATURATION: 96 %

## 2024-03-08 PROCEDURE — G0463 HOSPITAL OUTPT CLINIC VISIT: HCPCS

## 2024-03-08 NOTE — NON STRESS TEST
Obstetrical Non-stress Test Interpretation     Name:  Sherie Coleman  MRN: [id number]    28 y.o. female  at 25w3d    Indication: decreased fetal movement       Fetal Assessment  Fetal Movement: active  Fetal HR Assessment Method: external  Fetal HR (beats/min): 145  Fetal HR Baseline: normal range  Fetal HR Variability: moderate (amplitude range 6 to 25 bpm)    /63 (BP Location: Right arm)   Pulse 110   Resp 18   LMP 2023   SpO2 96%     Reason for test: OB Triage, Decreased fetal movement  Date of Test: 3/8/2024  Time frame of test: 0180-2241  RN NST Interpretation:  (appropriate for gestational age)      Adrienne Griffith RN  3/8/2024  16:23 EST

## 2024-03-08 NOTE — H&P
LARRY Roman  Obstetric History and Physical    Chief Complaint   Patient presents with    Decreased Fetal Movement       Subjective     HPI:    Patient is a 28 y.o. female  currently at 25w3d, who presents with decreased FM;  no ctx, no LOF, vb.    Her prenatal care is complicated by  prior   desires repeat .  Her previous obstetric/gynecological history is noted for is remarkable for 2 prior CS    The following portions of the patients history were reviewed and updated as appropriate:   current medications, allergies, past medical history, past surgical history, past family history, past social history and current problem list.     Prenatal Information:  Prenatal Results       Initial Prenatal Labs       Test Value Reference Range Date Time    Hemoglobin  13.8 g/dL 11.1 - 15.9 10/31/23 1335    Hematocrit  41.1 % 34.0 - 46.6 10/31/23 1335    Platelets  324 x10E3/uL 150 - 450 10/31/23 1335    Rubella IgG  6.35 index Immune >0.99 10/31/23 1335    Hepatitis B SAg  Negative  Negative 10/31/23 1335    Hepatitis C Ab  Non Reactive  Non Reactive 10/31/23 1335    RPR  Non Reactive  Non Reactive 24 1005       Non Reactive  Non Reactive 10/31/23 1335    T. Pallidum Ab         ABO  A   10/31/23 1335    Rh  Positive   10/31/23 1335    Antibody Screen  Negative  Negative 10/31/23 1335    HIV  Non Reactive  Non Reactive 10/31/23 1335    Urine Culture  25,000 CFU/mL Mixed Gracy Isolated   24 1435       Final report   10/30/23 1538    Gonorrhea  Negative  Negative 10/30/23 1515    Chlamydia  Negative  Negative 10/30/23 1515    TSH  1.860 uIU/mL 0.450 - 4.500 10/31/23 1335    HgB A1c   5.0 % 4.8 - 5.6 10/31/23 1335    Varicella IgG  625 index Immune >165 10/31/23 1335    HgB Electrophoresis         Cystic fibrosis                   Fetal testing        Test Value Reference Range Date Time    NIPT        MSAFP        AFP-4                  2nd and 3rd Trimester       Test Value Reference Range Date  Time    Hemoglobin (repeated)  13.1 g/dL 12.0 - 15.9 03/06/24 1005    Hematocrit (repeated)  38.3 % 34.0 - 46.6 03/06/24 1005    Platelets   215 10*3/mm3 140 - 450 03/06/24 1005       324 x10E3/uL 150 - 450 10/31/23 1335    GCT  131 mg/dL 65 - 139 03/06/24 1005    Antibody Screen (repeated)        Third Trimester syphilis screen (repeated)   Non Reactive  Non Reactive 03/06/24 1005    GTT Fasting        GTT 1 Hr        GTT 2 Hr        GTT 3 Hr        Group B Strep                  Other testing        Test Value Reference Range Date Time    Parvo IgG         CMV IgG                   Drug Screening       Test Value Reference Range Date Time    Amphetamine Screen        Barbiturate Screen        Benzodiazepine Screen        Methadone Screen        Phencyclidine Screen        Opiates Screen        THC Screen        Cocaine Screen        Propoxyphene Screen        Buprenorphine Screen        Methamphetamine Screen        Oxycodone Screen        Tricyclic Antidepressants Screen                  Legend    ^: Historical                          External Prenatal Results       Pregnancy Outside Results - Transcribed From Office Records - See Scanned Records For Details       Test Value Date Time    ABO  A  10/31/23 1335    Rh  Positive  10/31/23 1335    Antibody Screen  Negative  10/31/23 1335    Varicella IgG  625 index 10/31/23 1335    Rubella  6.35 index 10/31/23 1335    Hgb  13.1 g/dL 03/06/24 1005       13.8 g/dL 10/31/23 1335    Hct  38.3 % 03/06/24 1005       41.1 % 10/31/23 1335    Glucose Fasting GTT       Glucose Tolerance Test 1 hour       Glucose Tolerance Test 3 hour       Gonorrhea (discrete)  Negative  10/30/23 1515    Chlamydia (discrete)  Negative  10/30/23 1515    RPR  Non Reactive  03/06/24 1005       Non Reactive  10/31/23 1335    VDRL       Syphilis Antibody ^ <0.2 AI 04/29/21 1113    HBsAg  Negative  10/31/23 1335    Herpes Simplex Virus PCR       Herpes Simplex VIrus Culture       HIV  Non Reactive   10/31/23 1335    Hep C RNA Quant PCR       Hep C Antibody  Non Reactive  10/31/23 1335    AFP       Group B Strep       GBS Susceptibility to Clindamycin       GBS Susceptibility to Erythromycin       Fetal Fibronectin       Genetic Testing, Maternal Blood                 Drug Screening       Test Value Date Time    Urine Drug Screen       Amphetamine Screen       Barbiturate Screen       Benzodiazepine Screen       Methadone Screen       Phencyclidine Screen       Opiates Screen       THC Screen       Cocaine Screen       Propoxyphene Screen       Buprenorphine Screen       Methamphetamine Screen       Oxycodone Screen       Tricyclic Antidepressants Screen                 Legend    ^: Historical                             Past OB History:     OB History    Para Term  AB Living   3 2 1 1 0 2   SAB IAB Ectopic Molar Multiple Live Births   0 0 0 0 0 2      # Outcome Date GA Lbr Warner/2nd Weight Sex Type Anes PTL Lv   3 Current            2  22 35w5d  2665 g (5 lb 14 oz) F CS-LTranv Spinal  NESTOR      Complications: Placenta Previa      Name: edi Ashley Term 2021 39w0d  4111 g (9 lb 1 oz) M CS-LTranv EPI  NESTOR      Complications: Fetal Intolerance, Shoulder Dystocia      Name: nathan       Past Medical History: Past Medical History:   Diagnosis Date    History of stomach ulcers     Placenta previa 2021      Past Surgical History Past Surgical History:   Procedure Laterality Date     SECTION      WISDOM TOOTH EXTRACTION  2016      Family History: Family History   Problem Relation Age of Onset    Breast cancer Paternal Grandmother     Breast cancer Maternal Aunt         Lung Cancer      Social History:  reports that she has never smoked. She does not have any smokeless tobacco history on file.   reports that she does not currently use alcohol.   reports no history of drug use.        General ROS: Pertinent items are noted in HPI  Home Medications:  butalbital-acetaminophen-caffeine,  doxylamine-pyridoxine ER, famotidine, fluticasone, magnesium oxide, ondansetron ODT, prenatal vitamin, and prochlorperazine    Allergies:  Allergies   Allergen Reactions    Fd&C Red #3 (Erythrosine Sodium) Other (See Comments)     Other reaction(s): GI Reaction   Also pink dye in soaps: fever, vomiting, hives. Pink 5    Other Hives, Itching, Other (See Comments) and Rash     Pink five -dye, oxyclean, splenda    Mouth sores from splenda    Also pink dye in soaps: fever, vomiting, hives. Pink 5    Soap & Cleansers Other (See Comments)     Skin reaction to oxy clean    Other reaction(s): Dermatologic Reaction   Skin reaction to oxy clean    Alitraq Other (See Comments)     Mouth sores from splenda    Latex Rash       Objective       Vital Signs Range for the last 24 hours:  AFVSS  Temperature:     Temp Source:     BP:     Pulse:     Respirations:     SPO2:       Physical Examination:   General appearance - alert, well appearing, and in no distress  Mental status - alert, oriented to person, place, and time  Abdomen - soft, nontender, nondistended,   Back exam - full range of motion, no tenderness or pain on motion  Neurological - alert, oriented, normal speech  Extremities - peripheral pulses normal  Skin - normal coloration and turgor, no suspicious skin lesions noted    Presentation: unknown   Cervix:     Dilation:     Effacement:     Station:         Fetal Heart Rate Assessment : 140s, GLTV, 1 mild rina c/w gest age   Method:     Beats/min:     Baseline:     Variability:     Accels:     Decels:     Tracing Category:       Uterine Assessment :  quiet  Method:     Frequency (min):     Ctx Count in 10 min:     Duration:     Intensity:     Corwith Units:       GBS is unknown     Assessment & Plan     Decreased FM    Assessment:  1.  Intrauterine pregnancy at 25w3d gestation with reassuring fetal status.    2.  decreased fetal movement  3.  Obstetrical history significant for is remarkable for h/o CS x 2  4.  GBS  "status: No results found for: \"STREPGPB\"    Plan:  1. Discharge to home.  As watched for over an hour and baby looked great and pt feeling movement  2.  Plan of care has been reviewed with patient and patient agrees.   3.  Risks, benefits of treatment plan have been discussed.  4.  All questions have been answered.        Electronically signed by Debra He MD, 03/08/24, 2:33 PM EST.    "

## 2024-04-01 ENCOUNTER — TELEPHONE (OUTPATIENT)
Dept: OBSTETRICS AND GYNECOLOGY | Age: 29
End: 2024-04-01

## 2024-04-01 NOTE — TELEPHONE ENCOUNTER
Caller: JaredSherie    Relationship: Self    Best call back number: [phone] / LVM    Who is your current provider: RYANN BUTLER MD    Is your current provider offboarding? NO    Who would you like your new provider to be: [facility] - FEMALE PROVIDER PREFERRED    What are your reasons for transferring care: PT HAS MOVED AND [facility] IS CLOSER TO THE PT THAN [facility]    Additional notes: PT WOULD LIKE TO ESTABLISH CARE W/ ANY FEMALE PROVIDER AT [facility] FROM DR BUTLER @ [facility] - PT HAS MOVED AND [facility] IS MUCH CLOSER    PT'S NEXT OB F/U WITH DR BUTLER IS 04/03/24 @ 11:45am - PT WOULD LIKE TO SWITCH BEFORE THEN BUT IF NOT THAT WILL BE OK    THIS TE HAS BEEN SENT TO BOTH PRACTICES AND A REFERRAL HAS BEEN SENT TO [facility]    PLEASE CONTACT THE PT TO LET HER KNOW IF THIS REQUEST CAN BE MADE     THANK YOU!

## 2024-04-02 ENCOUNTER — TELEPHONE (OUTPATIENT)
Dept: OBSTETRICS AND GYNECOLOGY | Age: 29
End: 2024-04-02
Payer: MEDICAID

## 2024-04-02 NOTE — TELEPHONE ENCOUNTER
patient scheduled for Xray at the dentist tomorrow , would like to have a note faxed over to the dentist office stating is ok to have an Xray . Please advise ?   Fax # [phone]

## 2024-04-03 ENCOUNTER — ROUTINE PRENATAL (OUTPATIENT)
Dept: OBSTETRICS AND GYNECOLOGY | Age: 29
End: 2024-04-03
Payer: MEDICAID

## 2024-04-03 VITALS — SYSTOLIC BLOOD PRESSURE: 110 MMHG | BODY MASS INDEX: 36.47 KG/M2 | DIASTOLIC BLOOD PRESSURE: 64 MMHG | WEIGHT: 199.4 LBS

## 2024-04-03 DIAGNOSIS — Z98.891 HISTORY OF C-SECTION: ICD-10-CM

## 2024-04-03 DIAGNOSIS — Z3A.29 29 WEEKS GESTATION OF PREGNANCY: Primary | ICD-10-CM

## 2024-04-03 DIAGNOSIS — G43.709 CHRONIC MIGRAINE WITHOUT AURA WITHOUT STATUS MIGRAINOSUS, NOT INTRACTABLE: ICD-10-CM

## 2024-04-03 DIAGNOSIS — O99.210 OBESITY IN PREGNANCY, ANTEPARTUM: ICD-10-CM

## 2024-04-03 LAB
BILIRUB BLD-MCNC: NEGATIVE MG/DL
CLARITY, POC: CLEAR
COLOR UR: YELLOW
GLUCOSE UR STRIP-MCNC: ABNORMAL MG/DL
KETONES UR QL: NEGATIVE
LEUKOCYTE EST, POC: NEGATIVE
NITRITE UR-MCNC: NEGATIVE MG/ML
PH UR: 7 [PH] (ref 5–8)
PROT UR STRIP-MCNC: NEGATIVE MG/DL
RBC # UR STRIP: NEGATIVE /UL
SP GR UR: 1.02 (ref 1–1.03)
UROBILINOGEN UR QL: ABNORMAL

## 2024-04-03 NOTE — PROGRESS NOTES
Sherie Coleman, a 28 y.o.  at 29w1d, presents for OB follow-up.  She is doing well today.  Denies loss of fluid, vaginal bleeding, and contractions.  Endorses fetal movements.    Objective  BP Readings from Last 3 Encounters:   24 110/64   24 117/63   24 116/62      Wt Readings from Last 3 Encounters:   24 90.4 kg (199 lb 6.4 oz)   24 82.3 kg (181 lb 6.4 oz)   24 84.6 kg (186 lb 6.4 oz)      Total weight gain this pregnancy: 10.2 kg (22 lb 6.4 oz)    General: Awake, alert, no apparent distress  Respiratory: No increased work of breathing  Abdomen: Fundus soft and nontender  Extremity: Nontender, no edema    A/P  Diagnoses and all orders for this visit:    1. 29 weeks gestation of pregnancy (Primary)  Overview:  -PNL: IOB labs reviewed, A+, plan for GBS at 36wga  -Pap: Normal 2023 in [place]  -Genetics: NIPT low risk, Anatomy US at 20 weeks  -Imm: RI, VI, s/p  flu, tdap at 28 weeks   -Contraception: POPs   -Birthplan: CS/POPs/breast feeding   -Care coordination: accepts blood transfusions, no latex allergy, call schedule reviewed         Orders:  -     POC Urinalysis Dipstick    2. Obesity in pregnancy, antepartum  Overview:  CS planned at 39 weeks  A1c and TSH wnl  No  testing indicated, BMI not over 35        3. History of   Overview:  CS for placenta previa last pregnancy  Will evaluate at 20 weeks  Repeat CS planned at 39 weeks unless otherwise indicated      4. Chronic migraine without aura without status migrainosus, not intractable  Overview:  - New onset headaches since pregnancy  - Starts as pressure in the front of head and progresses to severe pain. -   - Has had episode of nausea with headaches  - Not responsive to tylenol  - Will try short course of fiorcet   - Referral to neurology > possibly consider imaging pending neuro recs > neuro appt 2/15  - May consider triptan if not improving   - MRI ordered 24> normal  - Significantly  improved as of 2/1/24          Labor precautions reviewed  No follow-ups on file.    Elina Romero MD

## 2024-04-11 ENCOUNTER — INITIAL PRENATAL (OUTPATIENT)
Dept: OBSTETRICS AND GYNECOLOGY | Facility: CLINIC | Age: 29
End: 2024-04-11
Payer: MEDICAID

## 2024-04-11 VITALS — DIASTOLIC BLOOD PRESSURE: 71 MMHG | SYSTOLIC BLOOD PRESSURE: 114 MMHG | WEIGHT: 195 LBS | BODY MASS INDEX: 35.67 KG/M2

## 2024-04-11 DIAGNOSIS — Z98.891 HISTORY OF C-SECTION: ICD-10-CM

## 2024-04-11 DIAGNOSIS — O09.899 HISTORY OF PRETERM DELIVERY, CURRENTLY PREGNANT: ICD-10-CM

## 2024-04-11 DIAGNOSIS — Z34.80 SUPERVISION OF OTHER NORMAL PREGNANCY, ANTEPARTUM: Primary | ICD-10-CM

## 2024-04-11 LAB
GLUCOSE UR STRIP-MCNC: NEGATIVE MG/DL
PROT UR STRIP-MCNC: NEGATIVE MG/DL

## 2024-04-11 RX ORDER — CETIRIZINE HYDROCHLORIDE 10 MG/1
10 TABLET ORAL DAILY
Qty: 90 TABLET | Refills: 4 | Status: SHIPPED | OUTPATIENT
Start: 2024-04-11

## 2024-04-11 NOTE — ASSESSMENT & PLAN NOTE
Doing well, is starting to have headaches.  Has had nasal congestion for a few weeks.  Takes tylenol with relief.   Will try adding zyrtec, increase water and protein intake  Prenatal records reviewed  Tdap prescription given  Fetal kick counts   labor precautions  HTN precautions

## 2024-04-11 NOTE — PROGRESS NOTES
OB FOLLOW UP      Chief Complaint   Patient presents with    Routine Prenatal Visit     Routine pregnancy follow up establish care patient headaches are coming back.      Subjective:   HA  Patient is transferring care from Dr. Romero's office in [place], all records available in Epic and reviewed.    Objective:  /71   Wt 88.5 kg (195 lb)   LMP 2023   BMI 35.67 kg/m²  8.165 kg (18 lb)  Uterine Size: size equals dates  FHT: 110-160 BPM    See OB flow for edema, cvx exam if performed, and Upro/Uglu    Assessment and Plan:  30w2d  Reassuring pregnancy progress.  Questions answered.  Diagnoses and all orders for this visit:    1. Supervision of other normal pregnancy, antepartum (Primary)  Overview:  PAWAN finalized: 24    Optional testing NIPS,CF/SMA,AFP:      COVID: Fully vaccinated  Flu: Vaccinated for 23-24 season  Tdap: Prescription given 24  RSV:    Rhogam: n/a  28-32 weeks repeat TPA: negative  ? Desires Sterilization: no    Anatomy US:24 Normal anatomy, anterior placenta, no previa, EFW:370g, 66.3%ile, AC:67%ile   FU US:    PROBLEM LIST/PLAN:   Previous C/S - planning repeat           Assessment & Plan:  Doing well, is starting to have headaches.  Has had nasal congestion for a few weeks.  Takes tylenol with relief.   Will try adding zyrtec, increase water and protein intake  Prenatal records reviewed  Tdap prescription given  Fetal kick counts   labor precautions  HTN precautions    Orders:  -     POC Urinalysis Dipstick  -     cetirizine (zyrTEC) 10 MG tablet; Take 1 tablet by mouth Daily.  Dispense: 90 tablet; Refill: 4    2. History of   Overview:  CS for placenta previa last pregnancy  Will evaluate at 20 weeks  Repeat CS planned at 39 weeks unless otherwise indicated    Assessment & Plan:  Still planning repeat  for delivery      3. History of  delivery, currently pregnant  Overview:  SROM at 35w5d with G2, also had placenta  previa        Counseling:    OB precautions, leaking, VB, tomasa navarro vs PTL/Labor  FKC  HTN precautions reviewed: HA, vision change, RUQ/epigastric pain, edema  Continue PNV.  Importance of healthy eating and staying active.  Increase water and protein intake    Return in about 2 weeks (around 4/25/2024) for [facility], OB follow up.        Adair Reeves, APRN  04/11/2024    [facility] [facility]  [facility]  [address] [address]  Dept: [phone]  Dept Fax: [phone]  Loc: [phone]

## 2024-04-12 ENCOUNTER — PATIENT ROUNDING (BHMG ONLY) (OUTPATIENT)
Dept: OBSTETRICS AND GYNECOLOGY | Facility: CLINIC | Age: 29
End: 2024-04-12
Payer: MEDICAID

## 2024-04-12 NOTE — PROGRESS NOTES
A My-Chart message has been sent to the patient for PATIENT ROUNDING with [facility].

## 2024-04-22 ENCOUNTER — REFERRAL TRIAGE (OUTPATIENT)
Dept: LABOR AND DELIVERY | Facility: HOSPITAL | Age: 29
End: 2024-04-22
Payer: MEDICAID

## 2024-04-23 ENCOUNTER — HOSPITAL ENCOUNTER (OUTPATIENT)
Facility: HOSPITAL | Age: 29
Discharge: HOME | End: 2024-04-23
Attending: OBSTETRICS & GYNECOLOGY | Admitting: OBSTETRICS & GYNECOLOGY
Payer: MEDICAID

## 2024-04-23 VITALS
TEMPERATURE: 98.1 F | OXYGEN SATURATION: 96 % | HEART RATE: 109 BPM | RESPIRATION RATE: 18 BRPM | DIASTOLIC BLOOD PRESSURE: 71 MMHG | SYSTOLIC BLOOD PRESSURE: 121 MMHG

## 2024-04-23 PROCEDURE — 59025 FETAL NON-STRESS TEST: CPT

## 2024-04-23 PROCEDURE — G0463 HOSPITAL OUTPT CLINIC VISIT: HCPCS

## 2024-04-23 RX ORDER — GUAIFENESIN 600 MG/1
1200 TABLET, EXTENDED RELEASE ORAL 2 TIMES DAILY
COMMUNITY
End: 2024-04-29

## 2024-04-23 NOTE — NURSING NOTE
, 32 weeks, presented with complaints of decreased fetal movement all day. Denies any labor complaints. Admitted to triage 1 for evaluation and monitoring. External monitors applied. Abdomen soft, non-tender to palpation. Fetal movement palpated. VS WNL.  Dr He notified of the above and will come evaluate.

## 2024-04-24 NOTE — SIGNIFICANT NOTE
Discharge instructions given and explained, verbalizes understanding. Left floor ambulatory,no distress noted.

## 2024-04-24 NOTE — H&P
LARRY Roman  Obstetric History and Physical    Chief Complaint   Patient presents with    Decreased Fetal Movement       Subjective     HPI:    Patient is a 28 y.o. female  currently at 32w0d, who presents with decreased FM today;  no ctx, no LOF, vb;  PNC in [place].    Her prenatal care is complicated by  prior   for previa .  Her previous obstetric/gynecological history is noted for is remarkable for CS x 2    The following portions of the patients history were reviewed and updated as appropriate:   current medications, allergies, past medical history, past surgical history, past family history, past social history and current problem list.     Prenatal Information:  Prenatal Results       Initial Prenatal Labs       Test Value Reference Range Date Time    Hemoglobin  13.8 g/dL 11.1 - 15.9 10/31/23 1335    Hematocrit  41.1 % 34.0 - 46.6 10/31/23 1335    Platelets  324 x10E3/uL 150 - 450 10/31/23 1335    Rubella IgG  6.35 index Immune >0.99 10/31/23 1335    Hepatitis B SAg  Negative  Negative 10/31/23 1335    Hepatitis C Ab  Non Reactive  Non Reactive 10/31/23 1335    RPR  Non Reactive  Non Reactive 24 1005       Non Reactive  Non Reactive 10/31/23 1335    T. Pallidum Ab         ABO  A   10/31/23 1335    Rh  Positive   10/31/23 1335    Antibody Screen  Negative  Negative 10/31/23 1335    HIV  Non Reactive  Non Reactive 10/31/23 1335    Urine Culture  25,000 CFU/mL Mixed Gracy Isolated   24 1435       Final report   10/30/23 1538    Gonorrhea  Negative  Negative 10/30/23 1515    Chlamydia  Negative  Negative 10/30/23 1515    TSH  1.860 uIU/mL 0.450 - 4.500 10/31/23 1335    HgB A1c   5.0 % 4.8 - 5.6 10/31/23 1335    Varicella IgG  625 index Immune >165 10/31/23 1335    HgB Electrophoresis         Cystic fibrosis                   Fetal testing        Test Value Reference Range Date Time    NIPT        MSAFP        AFP-4                  2nd and 3rd Trimester       Test Value Reference  Range Date Time    Hemoglobin (repeated)  13.1 g/dL 12.0 - 15.9 03/06/24 1005    Hematocrit (repeated)  38.3 % 34.0 - 46.6 03/06/24 1005    Platelets   215 10*3/mm3 140 - 450 03/06/24 1005       324 x10E3/uL 150 - 450 10/31/23 1335    GCT  131 mg/dL 65 - 139 03/06/24 1005    Antibody Screen (repeated)        3rd TM syphilis scrn (repeated)  RPR   Non Reactive  Non Reactive 03/06/24 1005    3rd TM syphilis scrn (repeated) FTA        GTT Fasting  76 mg/dL 65 - 94 03/12/24 0814    GTT 1 Hr  143 mg/dL 65 - 179 03/12/24 0814    GTT 2 Hr  143 mg/dL 65 - 154 03/12/24 0814    GTT 3 Hr  103 mg/dL 65 - 139 03/12/24 0814    Group B Strep                  Other testing        Test Value Reference Range Date Time    Parvo IgG         CMV IgG                   Drug Screening       Test Value Reference Range Date Time    Amphetamine Screen        Barbiturate Screen        Benzodiazepine Screen        Methadone Screen        Phencyclidine Screen        Opiates Screen        THC Screen        Cocaine Screen        Propoxyphene Screen        Buprenorphine Screen        Methamphetamine Screen        Oxycodone Screen        Tricyclic Antidepressants Screen                  Legend    ^: Historical                          External Prenatal Results       Pregnancy Outside Results - Transcribed From Office Records - See Scanned Records For Details       Test Value Date Time    ABO  A  10/31/23 1335    Rh  Positive  10/31/23 1335    Antibody Screen  Negative  10/31/23 1335    Varicella IgG  625 index 10/31/23 1335    Rubella  6.35 index 10/31/23 1335    Hgb  13.1 g/dL 03/06/24 1005       13.8 g/dL 10/31/23 1335    Hct  38.3 % 03/06/24 1005       41.1 % 10/31/23 1335    Glucose Fasting GTT  76 mg/dL 03/12/24 0814    Glucose Tolerance Test 1 hour  143 mg/dL 03/12/24 0814    Glucose Tolerance Test 3 hour  103 mg/dL 03/12/24 0814    Gonorrhea (discrete)  Negative  10/30/23 1515    Chlamydia (discrete)  Negative  10/30/23 1515    RPR  Non  Reactive  24 1005       Non Reactive  10/31/23 1335    VDRL       Syphilis Antibody ^ <0.2 AI 21 1113    HBsAg  Negative  10/31/23 1335    Herpes Simplex Virus PCR       Herpes Simplex VIrus Culture       HIV  Non Reactive  10/31/23 1335    Hep C RNA Quant PCR       Hep C Antibody  Non Reactive  10/31/23 1335    AFP       Group B Strep       GBS Susceptibility to Clindamycin       GBS Susceptibility to Erythromycin       Fetal Fibronectin       Genetic Testing, Maternal Blood                 Drug Screening       Test Value Date Time    Urine Drug Screen       Amphetamine Screen       Barbiturate Screen       Benzodiazepine Screen       Methadone Screen       Phencyclidine Screen       Opiates Screen       THC Screen       Cocaine Screen       Propoxyphene Screen       Buprenorphine Screen       Methamphetamine Screen       Oxycodone Screen       Tricyclic Antidepressants Screen                 Legend    ^: Historical                             Past OB History:     OB History    Para Term  AB Living   3 2 1 1 0 2   SAB IAB Ectopic Molar Multiple Live Births   0 0 0 0 0 2      # Outcome Date GA Lbr Warner/2nd Weight Sex Type Anes PTL Lv   3 Current            2  22 35w5d  2637 g (5 lb 13 oz) F CS-Unspec Spinal N NESTOR      Complications: Placenta Previa, Premature rupture of membranes      Name: edi   1 Term 21 39w4d  4120 g (9 lb 1.3 oz) M CS-LTranv EPI, Gen N NESTOR      Complications: Fetal Intolerance, Shoulder Dystocia      Name: COLEEN ROBBINS      Apgar1: 8  Apgar5: 9       Past Medical History: Past Medical History:   Diagnosis Date    History of stomach ulcers     Migraine     Placenta previa 2021      Past Surgical History Past Surgical History:   Procedure Laterality Date     SECTION      WISDOM TOOTH EXTRACTION        Family History: Family History   Problem Relation Age of Onset    Breast cancer Maternal Aunt         Lung Cancer    Breast cancer  Paternal Grandmother     Miscarriages / Stillbirths Neg Hx     Mental retardation Neg Hx     Mental illness Neg Hx     Malig Hyperthermia Neg Hx     Learning disabilities Neg Hx     Kidney disease Neg Hx     Hypertension Neg Hx     Hyperlipidemia Neg Hx     Heart disease Neg Hx     Hearing loss Neg Hx     Early death Neg Hx     Drug abuse Neg Hx     Diabetes Neg Hx     Depression Neg Hx     COPD Neg Hx     Cancer Neg Hx     Bleeding Disorder Neg Hx     Asthma Neg Hx     Arthritis Neg Hx     Alcohol abuse Neg Hx     Birth defects Neg Hx       Social History:  reports that she has never smoked. She does not have any smokeless tobacco history on file.   reports that she does not currently use alcohol.   reports no history of drug use.        General ROS: Pertinent items are noted in HPI  Home Medications:  guaiFENesin and prenatal vitamin    Allergies:  Allergies   Allergen Reactions    Fd&C Red #3 (Erythrosine Sodium) Other (See Comments)     Other reaction(s): GI Reaction   Also pink dye in soaps: fever, vomiting, hives. Pink 5    Other Hives, Itching, Other (See Comments) and Rash     Pink five -dye, oxyclean, splenda    Mouth sores from splenda    Also pink dye in soaps: fever, vomiting, hives. Pink 5    Soap & Cleansers Other (See Comments)     Skin reaction to oxy clean    Other reaction(s): Dermatologic Reaction   Skin reaction to oxy clean    Alitraq Other (See Comments)     Mouth sores from splenda    Latex Rash       Objective       Vital Signs Range for the last 24 hours  Temperature: Temp:  [98.1 °F (36.7 °C)] 98.1 °F (36.7 °C)   Temp Source: Temp src: Oral   BP: BP: (121)/(71) 121/71   Pulse: Heart Rate:  [109] 109   Respirations: Resp:  [18] 18   SPO2: SpO2:  [96 %] 96 %     Physical Examination:   General appearance - alert, well appearing, and in no distress  Mental status - alert, oriented to person, place, and time  Abdomen - soft, nontender, nondistended,   Back exam - full range of motion, no  "tenderness or pain on motion  Neurological - alert, oriented, normal speech  Extremities - peripheral pulses normal  Skin - normal coloration and turgor, no suspicious skin lesions noted    Presentation: unknown   Cervix:     Dilation:     Effacement:     Station:         Fetal Heart Rate Assessment :  140s, R, GLTV, AGA  Method:     Beats/min:     Baseline:     Variability:     Accels:     Decels:     Tracing Category:       Uterine Assessment :  quiet  Method:     Frequency (min):     Ctx Count in 10 min:     Duration:     Intensity:     Berne Units:       GBS is unknown     Assessment & Plan     Decreased FM    Assessment:  1.  Intrauterine pregnancy at 32w0d gestation with reactive fetal status.    2.  decreased fetal movement  3.  Obstetrical history significant for is remarkable for CS x 2  4.  GBS status: No results found for: \"STREPGPB\"    Plan:  1. Discharge to home.  As baby looks great and is moving now  2.  Plan of care has been reviewed with patient and patient agrees.   3.  Risks, benefits of treatment plan have been discussed.  4.  All questions have been answered.        Electronically signed by Debra He MD, 04/23/24, 8:04 PM EDT.    "

## 2024-04-24 NOTE — NON STRESS TEST
Obstetrical Non-stress Test Interpretation     Name:  Sherie Coleman  MRN: [id number]    28 y.o. female  at 32w0d    Indication: decreased fetal movement      Fetal Assessment  Fetal Movement: active  Fetal HR Assessment Method: external  Fetal HR (beats/min): 145  Fetal HR Baseline: normal range  Fetal HR Variability: moderate (amplitude range 6 to 25 bpm)  Fetal HR Accelerations: episodic, greater than/equal to 15 bpm, lasting at least 15 seconds  Fetal HR Decelerations: absent    /71 (BP Location: Right arm, Patient Position: Sitting)   Pulse 109   Temp 98.1 °F (36.7 °C) (Oral)   Resp 18   LMP 2023   SpO2 96%     Reason for test: OB Triage  Date of Test: 2024  Time frame of test: 6051-6281  RN NST Interpretation: Reactive      Windy Raygoza RN  2024  20:24 EDT

## 2024-04-29 ENCOUNTER — HOSPITAL ENCOUNTER (OUTPATIENT)
Facility: HOSPITAL | Age: 29
Discharge: HOME | End: 2024-04-29
Attending: OBSTETRICS & GYNECOLOGY | Admitting: OBSTETRICS & GYNECOLOGY
Payer: MEDICAID

## 2024-04-29 ENCOUNTER — ROUTINE PRENATAL (OUTPATIENT)
Dept: OBSTETRICS AND GYNECOLOGY | Facility: CLINIC | Age: 29
End: 2024-04-29
Payer: MEDICAID

## 2024-04-29 VITALS
DIASTOLIC BLOOD PRESSURE: 66 MMHG | SYSTOLIC BLOOD PRESSURE: 118 MMHG | HEART RATE: 108 BPM | OXYGEN SATURATION: 96 % | RESPIRATION RATE: 18 BRPM

## 2024-04-29 VITALS — BODY MASS INDEX: 37.31 KG/M2 | SYSTOLIC BLOOD PRESSURE: 130 MMHG | DIASTOLIC BLOOD PRESSURE: 86 MMHG | WEIGHT: 204 LBS

## 2024-04-29 DIAGNOSIS — Z34.80 SUPERVISION OF OTHER NORMAL PREGNANCY, ANTEPARTUM: Primary | ICD-10-CM

## 2024-04-29 LAB
ALBUMIN SERPL-MCNC: 3.6 G/DL (ref 3.5–5.2)
ALBUMIN/GLOB SERPL: 1.3 G/DL
ALP SERPL-CCNC: 95 U/L (ref 39–117)
ALT SERPL W P-5'-P-CCNC: 10 U/L (ref 1–33)
AMYLASE SERPL-CCNC: 52 U/L (ref 28–100)
ANION GAP SERPL CALCULATED.3IONS-SCNC: 13.2 MMOL/L (ref 5–15)
AST SERPL-CCNC: 12 U/L (ref 1–32)
BILIRUB SERPL-MCNC: 0.2 MG/DL (ref 0–1.2)
BUN SERPL-MCNC: 5 MG/DL (ref 6–20)
BUN/CREAT SERPL: 10.2 (ref 7–25)
CALCIUM SPEC-SCNC: 8.7 MG/DL (ref 8.6–10.5)
CHLORIDE SERPL-SCNC: 103 MMOL/L (ref 98–107)
CO2 SERPL-SCNC: 18.8 MMOL/L (ref 22–29)
CREAT SERPL-MCNC: 0.49 MG/DL (ref 0.57–1)
DEPRECATED RDW RBC AUTO: 42.7 FL (ref 37–54)
EGFRCR SERPLBLD CKD-EPI 2021: 132.7 ML/MIN/1.73
ERYTHROCYTE [DISTWIDTH] IN BLOOD BY AUTOMATED COUNT: 13.7 % (ref 12.3–15.4)
FIBRONECTIN FETAL VAG QL: NEGATIVE
GLOBULIN UR ELPH-MCNC: 2.8 GM/DL
GLUCOSE SERPL-MCNC: 147 MG/DL (ref 65–99)
GLUCOSE UR STRIP-MCNC: ABNORMAL MG/DL
HCT VFR BLD AUTO: 40 % (ref 34–46.6)
HGB BLD-MCNC: 13.4 G/DL (ref 12–15.9)
LIPASE SERPL-CCNC: 20 U/L (ref 13–60)
MCH RBC QN AUTO: 28.4 PG (ref 26.6–33)
MCHC RBC AUTO-ENTMCNC: 33.5 G/DL (ref 31.5–35.7)
MCV RBC AUTO: 84.7 FL (ref 79–97)
PLATELET # BLD AUTO: 211 10*3/MM3 (ref 140–450)
PMV BLD AUTO: 10.8 FL (ref 6–12)
POTASSIUM SERPL-SCNC: 3.7 MMOL/L (ref 3.5–5.2)
PROT SERPL-MCNC: 6.4 G/DL (ref 6–8.5)
PROT UR STRIP-MCNC: NEGATIVE MG/DL
RBC # BLD AUTO: 4.72 10*6/MM3 (ref 3.77–5.28)
SODIUM SERPL-SCNC: 135 MMOL/L (ref 136–145)
WBC NRBC COR # BLD AUTO: 16.05 10*3/MM3 (ref 3.4–10.8)

## 2024-04-29 PROCEDURE — 80053 COMPREHEN METABOLIC PANEL: CPT | Performed by: OBSTETRICS & GYNECOLOGY

## 2024-04-29 PROCEDURE — 85027 COMPLETE CBC AUTOMATED: CPT | Performed by: OBSTETRICS & GYNECOLOGY

## 2024-04-29 PROCEDURE — 59025 FETAL NON-STRESS TEST: CPT

## 2024-04-29 PROCEDURE — 82150 ASSAY OF AMYLASE: CPT | Performed by: OBSTETRICS & GYNECOLOGY

## 2024-04-29 PROCEDURE — 83690 ASSAY OF LIPASE: CPT | Performed by: OBSTETRICS & GYNECOLOGY

## 2024-04-29 PROCEDURE — 82731 ASSAY OF FETAL FIBRONECTIN: CPT | Performed by: OBSTETRICS & GYNECOLOGY

## 2024-04-29 PROCEDURE — G0463 HOSPITAL OUTPT CLINIC VISIT: HCPCS

## 2024-04-29 PROCEDURE — 99214 OFFICE O/P EST MOD 30 MIN: CPT | Performed by: OBSTETRICS & GYNECOLOGY

## 2024-04-29 RX ORDER — FAMOTIDINE 20 MG/1
20 TABLET, FILM COATED ORAL
Status: DISCONTINUED | OUTPATIENT
Start: 2024-04-29 | End: 2024-04-29

## 2024-04-29 RX ORDER — FAMOTIDINE 20 MG/1
20 TABLET, FILM COATED ORAL ONCE
Status: COMPLETED | OUTPATIENT
Start: 2024-04-29 | End: 2024-04-29

## 2024-04-29 RX ORDER — CETIRIZINE HYDROCHLORIDE 10 MG/1
10 TABLET ORAL DAILY
COMMUNITY

## 2024-04-29 RX ADMIN — FAMOTIDINE 20 MG: 20 TABLET ORAL at 12:45

## 2024-04-29 NOTE — NON STRESS TEST
Obstetrical Non-stress Test Interpretation     Name:  Sherie Coleman  MRN: [id number]    28 y.o. female  at 32w6d    Indication: NST/SENT FROM OFFICE FOR NST AND LABS      Fetal Assessment  Fetal Movement: active  Fetal HR Assessment Method: external  Fetal HR (beats/min): 145  Fetal HR Baseline: normal range  Fetal HR Variability: moderate (amplitude range 6 to 25 bpm)  Fetal HR Accelerations: lasting at least 15 seconds  Fetal HR Decelerations: absent    /66 (BP Location: Right arm, Patient Position: Lying)   Pulse 108   Resp 18   LMP 2023   SpO2 96%     Reason for test: OB Triage (NST/SENT FROM OFFICE FOR NST AND LABS)  Date of Test: 2024  Time frame of test: 3270-0702  RN NST Interpretation: Reactive      Vandana Rebolledo RN  2024  14:06 EDT

## 2024-04-29 NOTE — H&P
LARRY Roman  Obstetric History and Physical    Chief Complaint   Patient presents with    Non-stress Test     SENT FROM OFFICE FOR NST AND LABS       Subjective     HPI:    Patient is a 28 y.o. female  currently at 32w6d, who presents with epigastric pain radiating bilaterally to LQ;  no n/v/d;  says pain comes and goes;  started last night;  good FM.    Her prenatal care is complicated by  prior   desires repeat .  Her previous obstetric/gynecological history is noted for is non-contributory.    The following portions of the patients history were reviewed and updated as appropriate:   current medications, allergies, past medical history, past surgical history, past family history, past social history and current problem list.     Prenatal Information:  Prenatal Results       Initial Prenatal Labs       Test Value Reference Range Date Time    Hemoglobin  13.8 g/dL 11.1 - 15.9 10/31/23 1335    Hematocrit  41.1 % 34.0 - 46.6 10/31/23 1335    Platelets  324 x10E3/uL 150 - 450 10/31/23 1335    Rubella IgG  6.35 index Immune >0.99 10/31/23 1335    Hepatitis B SAg  Negative  Negative 10/31/23 1335    Hepatitis C Ab  Non Reactive  Non Reactive 10/31/23 1335    RPR  Non Reactive  Non Reactive 24 1005       Non Reactive  Non Reactive 10/31/23 1335    T. Pallidum Ab         ABO  A   10/31/23 1335    Rh  Positive   10/31/23 1335    Antibody Screen  Negative  Negative 10/31/23 1335    HIV  Non Reactive  Non Reactive 10/31/23 1335    Urine Culture  25,000 CFU/mL Mixed Gracy Isolated   24 1435       Final report   10/30/23 1538    Gonorrhea  Negative  Negative 10/30/23 1515    Chlamydia  Negative  Negative 10/30/23 1515    TSH  1.860 uIU/mL 0.450 - 4.500 10/31/23 1335    HgB A1c   5.0 % 4.8 - 5.6 10/31/23 1335    Varicella IgG  625 index Immune >165 10/31/23 1335    HgB Electrophoresis         Cystic fibrosis                   Fetal testing        Test Value Reference Range Date Time    NIPT         MSAFP        AFP-4                  2nd and 3rd Trimester       Test Value Reference Range Date Time    Hemoglobin (repeated)  13.4 g/dL 12.0 - 15.9 04/29/24 1124       13.1 g/dL 12.0 - 15.9 03/06/24 1005    Hematocrit (repeated)  40.0 % 34.0 - 46.6 04/29/24 1124       38.3 % 34.0 - 46.6 03/06/24 1005    Platelets   211 10*3/mm3 140 - 450 04/29/24 1124       215 10*3/mm3 140 - 450 03/06/24 1005       324 x10E3/uL 150 - 450 10/31/23 1335    GCT  131 mg/dL 65 - 139 03/06/24 1005    Antibody Screen (repeated)        3rd TM syphilis scrn (repeated)  RPR   Non Reactive  Non Reactive 03/06/24 1005    3rd TM syphilis scrn (repeated) FTA        GTT Fasting  76 mg/dL 65 - 94 03/12/24 0814    GTT 1 Hr  143 mg/dL 65 - 179 03/12/24 0814    GTT 2 Hr  143 mg/dL 65 - 154 03/12/24 0814    GTT 3 Hr  103 mg/dL 65 - 139 03/12/24 0814    Group B Strep                  Other testing        Test Value Reference Range Date Time    Parvo IgG         CMV IgG                   Drug Screening       Test Value Reference Range Date Time    Amphetamine Screen        Barbiturate Screen        Benzodiazepine Screen        Methadone Screen        Phencyclidine Screen        Opiates Screen        THC Screen        Cocaine Screen        Propoxyphene Screen        Buprenorphine Screen        Methamphetamine Screen        Oxycodone Screen        Tricyclic Antidepressants Screen                  Legend    ^: Historical                          External Prenatal Results       Pregnancy Outside Results - Transcribed From Office Records - See Scanned Records For Details       Test Value Date Time    ABO  A  10/31/23 1335    Rh  Positive  10/31/23 1335    Antibody Screen  Negative  10/31/23 1335    Varicella IgG  625 index 10/31/23 1335    Rubella  6.35 index 10/31/23 1335    Hgb  13.4 g/dL 04/29/24 1124       13.1 g/dL 03/06/24 1005       13.8 g/dL 10/31/23 1335    Hct  40.0 % 04/29/24 1124       38.3 % 03/06/24 1005       41.1 % 10/31/23 1335     Glucose Fasting GTT  76 mg/dL 24 0814    Glucose Tolerance Test 1 hour  143 mg/dL 24 0814    Glucose Tolerance Test 3 hour  103 mg/dL 24 0814    Gonorrhea (discrete)  Negative  10/30/23 1515    Chlamydia (discrete)  Negative  10/30/23 1515    RPR  Non Reactive  24 1005       Non Reactive  10/31/23 1335    VDRL       Syphilis Antibody ^ <0.2 AI 21 1113    HBsAg  Negative  10/31/23 1335    Herpes Simplex Virus PCR       Herpes Simplex VIrus Culture       HIV  Non Reactive  10/31/23 1335    Hep C RNA Quant PCR       Hep C Antibody  Non Reactive  10/31/23 1335    AFP       Group B Strep       GBS Susceptibility to Clindamycin       GBS Susceptibility to Erythromycin       Fetal Fibronectin  Negative  24 1218    Genetic Testing, Maternal Blood                 Drug Screening       Test Value Date Time    Urine Drug Screen       Amphetamine Screen       Barbiturate Screen       Benzodiazepine Screen       Methadone Screen       Phencyclidine Screen       Opiates Screen       THC Screen       Cocaine Screen       Propoxyphene Screen       Buprenorphine Screen       Methamphetamine Screen       Oxycodone Screen       Tricyclic Antidepressants Screen                 Legend    ^: Historical                             Past OB History:     OB History    Para Term  AB Living   3 2 1 1 0 2   SAB IAB Ectopic Molar Multiple Live Births   0 0 0 0 0 2      # Outcome Date GA Lbr Warner/2nd Weight Sex Type Anes PTL Lv   3 Current            2  22 35w5d  2637 g (5 lb 13 oz) F CS-Unspec Spinal N NESTOR      Complications: Placenta Previa, Premature rupture of membranes      Name: edi   1 Term 21 39w4d  4120 g (9 lb 1.3 oz) M CS-LTranv EPI, Gen N NESTOR      Complications: Fetal Intolerance, Shoulder Dystocia      Name: COLEEN ROBBINS      Apgar1: 8  Apgar5: 9       Past Medical History: Past Medical History:   Diagnosis Date    History of stomach ulcers     Migraine      Placenta previa 2021      Past Surgical History Past Surgical History:   Procedure Laterality Date     SECTION      x2    WISDOM TOOTH EXTRACTION  2016      Family History: Family History   Problem Relation Age of Onset    Breast cancer Paternal Grandmother     Breast cancer Maternal Aunt     Lung cancer Maternal Aunt     Miscarriages / Stillbirths Neg Hx     Mental retardation Neg Hx     Mental illness Neg Hx     Malig Hyperthermia Neg Hx     Learning disabilities Neg Hx     Kidney disease Neg Hx     Hypertension Neg Hx     Hyperlipidemia Neg Hx     Heart disease Neg Hx     Hearing loss Neg Hx     Early death Neg Hx     Drug abuse Neg Hx     Diabetes Neg Hx     Depression Neg Hx     COPD Neg Hx     Cancer Neg Hx     Bleeding Disorder Neg Hx     Asthma Neg Hx     Arthritis Neg Hx     Alcohol abuse Neg Hx     Birth defects Neg Hx       Social History:  reports that she has never smoked. She does not have any smokeless tobacco history on file.   reports that she does not currently use alcohol.   reports no history of drug use.        General ROS: Pertinent items are noted in HPI  Home Medications:  cetirizine and prenatal vitamin    Allergies:  Allergies   Allergen Reactions    Fd&C Red #3 (Erythrosine Sodium) Other (See Comments)     Other reaction(s): GI Reaction   Also pink dye in soaps: fever, vomiting, hives. Pink 5    Other Hives, Itching, Other (See Comments) and Rash     Pink five -dye, oxyclean, splenda    Mouth sores from splenda    Also pink dye in soaps: fever, vomiting, hives. Pink 5    Soap & Cleansers Other (See Comments)     Skin reaction to oxy clean    Other reaction(s): Dermatologic Reaction   Skin reaction to oxy clean    Alitraq Other (See Comments)     Mouth sores from splenda    Latex Rash       Objective       Vital Signs Range for the last 24 hours  Temperature:     Temp Source:     BP: BP: (125-130)/(56-86) 125/56   Pulse:     Respirations:     SPO2:       Physical Examination:  "  General appearance - alert, well appearing, and in no distress  Mental status - alert, oriented to person, place, and time  Abdomen - soft, nontender, nondistended, no R/G  Pelvic - normal external genitalia, vulva, vagina, cervix, and uterus   Back exam - full range of motion, no tenderness or pain on motion  Neurological - alert, oriented, normal speech  Extremities - peripheral pulses normal  Skin - normal coloration and turgor, no suspicious skin lesions noted    Presentation: unknown   Cervix: Method: sterile exam per RN   Dilation: Cervical Dilation (cm): 0   Effacement: Cervical Effacement: 20%   Station:         Fetal Heart Rate Assessment   Method: Fetal HR Assessment Method: external   Beats/min: Fetal HR (beats/min): 145   Baseline: Fetal HR Baseline: normal range   Variability: Fetal HR Variability: moderate (amplitude range 6 to 25 bpm)   Accels: Fetal HR Accelerations: greater than/equal to 15 bpm, lasting at least 15 seconds   Decels: Fetal HR Decelerations: absent   Tracing Category:       Uterine Assessment   Method: Method: palpation, external tocotransducer   Frequency (min):     Ctx Count in 10 min:     Duration:     Intensity: Contraction Intensity: no contractions   [place] Units:       GBS is unknown     Cmp, cbc, lipase, amylase unremarkable other than mild white count    Ffn - neg    Assessment & Plan     Abdominal pain  False labor    Assessment:  1.  Intrauterine pregnancy at 32w6d gestation with reactive fetal status.    2.   labor  false  3.  Obstetrical history significant for is non-contributory.  4.  GBS status: No results found for: \"STREPGPB\"    Plan:  1. Discharge to home.  All labs wnl and PE not remarkable;  precautions given  2.  Plan of care has been reviewed with patient and patient agrees.   3.  Risks, benefits of treatment plan have been discussed.  4.  All questions have been answered.        Electronically signed by Debra He MD, 24, 1:01 PM " EDT.

## 2024-04-29 NOTE — PROGRESS NOTES
OB FOLLOW UP  CC- Here for care of pregnancy        Sherie Coleman is a 28 y.o.  32w6d patient being seen today for her obstetrical follow up visit. Patient reports  abdominal cramping .     Her prenatal care is complicated by (and status) :    Patient Active Problem List   Diagnosis    History of     Supervision of other normal pregnancy, antepartum    Obesity in pregnancy, antepartum    Chronic migraine without aura without status migrainosus, not intractable    History of  delivery, currently pregnant       Flu Status:   Covid Status:    ROS -   Patient Reports : Cramping  Patient Denies: Loss of Fluid, Vaginal Spotting, Vision Changes, and Headaches  Fetal Movement : normal  All other systems reviewed and are negative.       The additional following portions of the patient's history were reviewed and updated as appropriate: allergies, current medications, past family history, past medical history, past social history, past surgical history, and problem list.    I have reviewed and agree with the HPI, ROS, and historical information as entered above. Angelica Mercer, DO    /86   Wt 92.5 kg (204 lb)   LMP 2023   BMI 37.31 kg/m²       EXAM:     FHT: 154 BPM   Uterine Size:  33 cm  Pelvic Exam: No    Urine glucose/protein: See prenatal flowsheet       Assessment and Plan  Diagnoses and all orders for this visit:    1. Supervision of other normal pregnancy, antepartum (Primary)  Overview:  PAWAN finalized: 24    Optional testing NIPS,CF/SMA,AFP:      COVID: Fully vaccinated  Flu: Vaccinated for 23-24 season  Tdap: Prescription given 24  RSV:    Rhogam: n/a  28-32 weeks repeat TPA: negative  ? Desires Sterilization: no    Anatomy US:24 Normal anatomy, anterior placenta, no previa, EFW:370g, 66.3%ile, AC:67%ile   FU US:    PROBLEM LIST/PLAN:   Previous C/S - planning repeat           Orders:  -     POC Urinalysis Dipstick         Pregnancy at 32w6d  Fetal status  reassuring.   Activity and Exercise discussed.  Return in about 2 weeks (around 2024).  Kick counts bid  BP sl increased today.  This is my first visit with this patient she is transfer OB.  She is c/o contractions with hx  delivery last preg at 35 weeks.  Will send for NST, cmp, pc ratio. Labor and delivery notified and orders given    Angelica Mercer DO  2024

## 2024-05-08 NOTE — PROGRESS NOTES
OB FOLLOW UP      Chief Complaint   Patient presents with    Routine Prenatal Visit     Subjective:   New patient to me  No complaints    Objective:  /78   Wt 93.4 kg (206 lb)   LMP 09/12/2023   BMI 37.68 kg/m²  13.2 kg (29 lb) Facility age limit for growth %keyanna is 20 years.  See OB flow sheet for fundal height (not performed if US day of OV), FHT, edema, cvx exam if performed, and Upro/Uglu  Chaperone present during pelvic exam if performed.      Assessment and Plan:  34w6d     Diagnoses and all orders for this visit:    1. Supervision of other normal pregnancy, antepartum (Primary)  Overview:  PAWAN finalized: 6/18/24 by LMP and 6-week ultrasound    Adria neg XY (reported by prior OB), by report CF neg prior preg    COVID: Recommended  Flu: Vaccinated   Tdap: Vaccinated    28-32 weeks repeat TPA: negative  ? Desires Sterilization: Declines    Anatomy US:2/1/24 Normal anatomy, anterior placenta, no previa, EFW:370g, 66.3%ile, AC:67%ile     PROBLEM LIST/PLAN:   Late transfer of care- @30wks  Previous C/S x2 - 5/13/2024 desires Dr Mercer  Elevated 1 hour-normal 3-hour    Orders:  -     POC Urinalysis Dipstick      Counseling:    OB precautions, leaking, VB, tomasa navarro vs PTL/Labor  FKC  VACCINE importance in pregnancy discussed.  Maternal and fetal risk of not being vaccinated reviewed NLT increased risk maternal/fetal severity of illness/death, PTD, CS, hemorrhage, HTN, possible IUGR and/or IUFD.  Maternal and fetal/infant benefit w vaccination.  FDA approval and ACOG/SMFM/CDC recommendation in pregnancy.  Questions answered.     Reassuring pregnancy progress. Questions answered.  Continue PNV.  Importance of healthy eating, obtaining sufficient sleep, and staying active unless hypertensive- activity modified as directed.    Return in about 1 week (around 5/20/2024) for FU OB q1wk to PAWAN/Delivery.            Traci Olivia, DO  05/13/2024    [facility]  [id number] [place] [address]  Dept: [phone]  Dept Fax: [phone]  Loc: [phone]  Loc Fax: [phone]

## 2024-05-13 ENCOUNTER — ROUTINE PRENATAL (OUTPATIENT)
Dept: OBSTETRICS AND GYNECOLOGY | Facility: CLINIC | Age: 29
End: 2024-05-13
Payer: MEDICAID

## 2024-05-13 VITALS — SYSTOLIC BLOOD PRESSURE: 117 MMHG | BODY MASS INDEX: 37.68 KG/M2 | WEIGHT: 206 LBS | DIASTOLIC BLOOD PRESSURE: 78 MMHG

## 2024-05-13 DIAGNOSIS — Z34.80 SUPERVISION OF OTHER NORMAL PREGNANCY, ANTEPARTUM: Primary | ICD-10-CM

## 2024-05-13 LAB
GLUCOSE UR STRIP-MCNC: NEGATIVE MG/DL
PROT UR STRIP-MCNC: NEGATIVE MG/DL

## 2024-05-13 PROCEDURE — 99213 OFFICE O/P EST LOW 20 MIN: CPT | Performed by: OBSTETRICS & GYNECOLOGY

## 2024-05-20 ENCOUNTER — ROUTINE PRENATAL (OUTPATIENT)
Dept: OBSTETRICS AND GYNECOLOGY | Facility: CLINIC | Age: 29
End: 2024-05-20
Payer: MEDICAID

## 2024-05-20 VITALS — WEIGHT: 206 LBS | DIASTOLIC BLOOD PRESSURE: 71 MMHG | BODY MASS INDEX: 37.68 KG/M2 | SYSTOLIC BLOOD PRESSURE: 102 MMHG

## 2024-05-20 DIAGNOSIS — Z34.80 SUPERVISION OF OTHER NORMAL PREGNANCY, ANTEPARTUM: ICD-10-CM

## 2024-05-20 LAB
GLUCOSE UR STRIP-MCNC: NEGATIVE MG/DL
PROT UR STRIP-MCNC: NEGATIVE MG/DL

## 2024-05-20 PROCEDURE — 87653 STREP B DNA AMP PROBE: CPT | Performed by: OBSTETRICS & GYNECOLOGY

## 2024-05-21 LAB — GROUP B STREP, DNA: NEGATIVE

## 2024-05-21 NOTE — PROGRESS NOTES
OB FOLLOW UP  CC- Here for care of pregnancy        Sherie Coleman is a 28 y.o.  36w0d patient being seen today for her obstetrical follow up visit. Patient reports no complaints.     Her prenatal care is complicated by (and status) :    Patient Active Problem List   Diagnosis    History of     Supervision of other normal pregnancy, antepartum    Obesity in pregnancy, antepartum    Chronic migraine without aura without status migrainosus, not intractable    History of  delivery, currently pregnant       Flu Status:   Covid Status:    ROS -   Patient Reports : No Problems  Patient Denies: Loss of Fluid and Vaginal Spotting  Fetal Movement : normal  All other systems reviewed and are negative.       The additional following portions of the patient's history were reviewed and updated as appropriate: allergies, current medications, past family history, past medical history, past social history, past surgical history, and problem list.    I have reviewed and agree with the HPI, ROS, and historical information as entered above. Angelica Mercer, DO    /71   Wt 93.4 kg (206 lb)   LMP 2023   BMI 37.68 kg/m²       EXAM:     FHT: 136 BPM   Uterine Size:  34 cm  Pelvic Exam: Yes.  Presentation: unsure. Dilation: Closed. Effacement: Long. Station: -3.    Urine glucose/protein: See prenatal flowsheet       Assessment and Plan  Diagnoses and all orders for this visit:    1. Supervision of other normal pregnancy, antepartum  Overview:  PAWAN finalized: 24 by LMP and 6-week ultrasound    Adria neg XY (reported by prior OB), by report CF neg prior preg    COVID: Recommended  Flu: Vaccinated   Tdap: Vaccinated    28-32 weeks repeat TPA: negative  ? Desires Sterilization: Declines    Anatomy US:24 Normal anatomy, anterior placenta, no previa, EFW:370g, 66.3%ile, AC:67%ile     PROBLEM LIST/PLAN:   Late transfer of care- @30wks  Previous C/S x2 - 2024 desires Dr Mercer  Elevated 1  hour-normal 3-hour    Orders:  -     POC Urinalysis Dipstick  -     Group B Strep (Molecular) - Swab, Vaginal/Rectum         Pregnancy at 36w0d  Fetal status reassuring.   Activity and Exercise discussed.  Return in about 1 week (around 5/27/2024).  Group B strep done  Kick counts bid  Declines sterilization    Angelica Mercer,   05/20/2024

## 2024-05-31 ENCOUNTER — ROUTINE PRENATAL (OUTPATIENT)
Dept: OBSTETRICS AND GYNECOLOGY | Facility: CLINIC | Age: 29
End: 2024-05-31
Payer: MEDICAID

## 2024-05-31 VITALS — SYSTOLIC BLOOD PRESSURE: 115 MMHG | DIASTOLIC BLOOD PRESSURE: 76 MMHG | BODY MASS INDEX: 37.86 KG/M2 | WEIGHT: 207 LBS

## 2024-05-31 DIAGNOSIS — Z34.80 SUPERVISION OF OTHER NORMAL PREGNANCY, ANTEPARTUM: Primary | ICD-10-CM

## 2024-05-31 LAB
GLUCOSE UR STRIP-MCNC: NEGATIVE MG/DL
PROT UR STRIP-MCNC: NEGATIVE MG/DL

## 2024-05-31 NOTE — PROGRESS NOTES
OB FOLLOW UP  CC- Here for care of pregnancy        Sherie Coleman is a 28 y.o.  37w3d patient being seen today for her obstetrical follow up visit. Patient reports no complaints.     Her prenatal care is complicated by (and status) :    Patient Active Problem List   Diagnosis    History of     Supervision of other normal pregnancy, antepartum    Obesity in pregnancy, antepartum    Chronic migraine without aura without status migrainosus, not intractable    History of  delivery, currently pregnant       Flu Status:   Covid Status:    ROS -   Patient Reports : No Problems  Patient Denies: Loss of Fluid and Vaginal Spotting  Fetal Movement : normal  All other systems reviewed and are negative.       The additional following portions of the patient's history were reviewed and updated as appropriate: allergies, current medications, past family history, past medical history, past social history, past surgical history, and problem list.    I have reviewed and agree with the HPI, ROS, and historical information as entered above. Angelica Mercer, DO    /76   Wt 93.9 kg (207 lb)   LMP 2023   BMI 37.86 kg/m²       EXAM:     FHT: 145 BPM   Uterine Size:  34 cm  Pelvic Exam: No    Urine glucose/protein: See prenatal flowsheet       Assessment and Plan  Diagnoses and all orders for this visit:    1. Supervision of other normal pregnancy, antepartum (Primary)  Overview:  PAWAN finalized: 24 by LMP and 6-week ultrasound    Adria neg XY (reported by prior OB), by report CF neg prior preg    COVID: Recommended  Flu: Vaccinated   Tdap: Vaccinated    28-32 weeks repeat TPA: negative  ? Desires Sterilization: Declines    Anatomy US:24 Normal anatomy, anterior placenta, no previa, EFW:370g, 66.3%ile, AC:67%ile     PROBLEM LIST/PLAN:   Late transfer of care- @30wks  Previous C/S x2 - 2024 desires Dr Mercer  Elevated 1 hour-normal 3-hour    Orders:  -     POC Urinalysis Dipstick          Pregnancy at 37w3d  Fetal status reassuring.   Activity and Exercise discussed.  Return in about 1 week (around 6/7/2024).  Kick counts bid  Labor precautions    Angelica Mercer,   05/31/2024

## 2024-06-01 ENCOUNTER — HOSPITAL ENCOUNTER (OUTPATIENT)
Facility: HOSPITAL | Age: 29
Setting detail: SURGERY ADMIT
Discharge: HOME | End: 2024-06-01
Attending: OBSTETRICS & GYNECOLOGY | Admitting: OBSTETRICS & GYNECOLOGY
Payer: MEDICAID

## 2024-06-01 VITALS
TEMPERATURE: 98.7 F | WEIGHT: 207 LBS | SYSTOLIC BLOOD PRESSURE: 124 MMHG | DIASTOLIC BLOOD PRESSURE: 76 MMHG | RESPIRATION RATE: 18 BRPM | HEIGHT: 62 IN | BODY MASS INDEX: 38.09 KG/M2 | HEART RATE: 94 BPM

## 2024-06-01 LAB
A1 MICROGLOB PLACENTAL VAG QL: NEGATIVE
BILIRUB BLD-MCNC: NEGATIVE MG/DL
CLARITY, POC: CLEAR
COLOR UR: ABNORMAL
GLUCOSE UR STRIP-MCNC: NEGATIVE MG/DL
KETONES UR QL: ABNORMAL
LEUKOCYTE EST, POC: NEGATIVE
NITRITE UR-MCNC: NEGATIVE MG/ML
PH UR: 5.5 [PH] (ref 5–8)
PROT UR STRIP-MCNC: ABNORMAL MG/DL
RBC # UR STRIP: NEGATIVE /UL
SP GR UR: 1.03 (ref 1–1.03)
UROBILINOGEN UR QL: ABNORMAL

## 2024-06-01 PROCEDURE — G0463 HOSPITAL OUTPT CLINIC VISIT: HCPCS

## 2024-06-01 PROCEDURE — 84112 EVAL AMNIOTIC FLUID PROTEIN: CPT | Performed by: OBSTETRICS & GYNECOLOGY

## 2024-06-01 PROCEDURE — 59025 FETAL NON-STRESS TEST: CPT

## 2024-06-01 PROCEDURE — 81002 URINALYSIS NONAUTO W/O SCOPE: CPT | Performed by: OBSTETRICS & GYNECOLOGY

## 2024-06-02 ENCOUNTER — HOSPITAL ENCOUNTER (OUTPATIENT)
Facility: HOSPITAL | Age: 29
Discharge: HOME | End: 2024-06-02
Attending: OBSTETRICS & GYNECOLOGY | Admitting: OBSTETRICS & GYNECOLOGY
Payer: MEDICAID

## 2024-06-02 VITALS
SYSTOLIC BLOOD PRESSURE: 114 MMHG | RESPIRATION RATE: 18 BRPM | HEART RATE: 89 BPM | DIASTOLIC BLOOD PRESSURE: 58 MMHG | OXYGEN SATURATION: 97 %

## 2024-06-02 PROCEDURE — G0463 HOSPITAL OUTPT CLINIC VISIT: HCPCS

## 2024-06-02 PROCEDURE — 59025 FETAL NON-STRESS TEST: CPT

## 2024-06-02 NOTE — NON STRESS TEST
"Obstetrical Non-stress Test Interpretation     Name:  Sherie Coleman  MRN: [id number]    28 y.o. female  at 37w4d    Indication: Triage: Lower abdominal pain and increased discharge yesterday      Fetal Assessment  Fetal Movement: active  Fetal HR Assessment Method: external  Fetal HR (beats/min): 135  Fetal HR Baseline: normal range  Fetal HR Variability: moderate (amplitude range 6 to 25 bpm)  Fetal HR Accelerations: episodic, lasting at least 15 seconds, greater than/equal to 10 bpm (32 wks gest or less)  Fetal HR Decelerations: absent  Sinusoidal Pattern Present: absent    /76   Pulse 94   Temp 98.7 °F (37.1 °C) (Oral)   Resp 18   Ht 157.5 cm (62\")   Wt 93.9 kg (207 lb)   LMP 2023   BMI 37.86 kg/m²     Reason for test: OB Triage  Date of Test: 2024  Time frame of test:   RN NST Interpretation: Reactive      Rachel Pittman RN  2024  22:14 EDT  "

## 2024-06-02 NOTE — NURSING NOTE
28  @ 37 4/7 weeks gestation presents to labor and delivery  @  with c/o lower abdominal pain since 8am every 15-20 minutes. Reports increased discharge after seeing  Yesterday (no vaginal exam) that stopped last night. Denies vaginal bleeding. Reports good fetal movement. Denies h/a, blurred vision, or epigastric pain. Denies any complications with pregnancy. Urine sample POC done and [facility] applied.

## 2024-06-02 NOTE — NURSING NOTE
"Amnisure negative and no visible leaking of fluid or discharge. Asked patient if she has had any abdominal pain since being here and she reports \"no\". MD with v/o for discharge home.   "

## 2024-06-02 NOTE — H&P
LARRY Roman  Obstetric History and Physical    Chief Complaint   Patient presents with    Contractions       Subjective     HPI:    Patient is a 28 y.o. female  currently at 37w5d, who presents to OB ED because she lost her whole mucous plug and she is having occasional contractions. They live an hour away and are concerned that they need to get here quick. She states they were told to come in for passage of mucous plug. She reports good fetal movement. No bleeding. This will be her 3rd  section.    She has been seeing [facility] for her prenatal care.  The pregnancy has been complicated by the following problems:    Patient Active Problem List   Diagnosis    History of     Supervision of other normal pregnancy, antepartum    Obesity in pregnancy, antepartum    Chronic migraine without aura without status migrainosus, not intractable    History of  delivery, currently pregnant       The following portions of the patients history were reviewed and updated as appropriate:   current medications, allergies, past medical history, past surgical history, past family history, past social history and current problem list.     Prenatal Information:  Prenatal Results       Initial Prenatal Labs       Test Value Reference Range Date Time    Hemoglobin  13.8 g/dL 11.1 - 15.9 10/31/23 1335    Hematocrit  41.1 % 34.0 - 46.6 10/31/23 1335    Platelets  324 x10E3/uL 150 - 450 10/31/23 1335    Rubella IgG  6.35 index Immune >0.99 10/31/23 1335    Hepatitis B SAg  Negative  Negative 10/31/23 1335    Hepatitis C Ab  Non Reactive  Non Reactive 10/31/23 1335    RPR  Non Reactive  Non Reactive 24 1005       Non Reactive  Non Reactive 10/31/23 1335    T. Pallidum Ab         ABO  A   10/31/23 1335    Rh  Positive   10/31/23 1335    Antibody Screen  Negative  Negative 10/31/23 1335    HIV  Non Reactive  Non Reactive 10/31/23 1335    Urine Culture  25,000 CFU/mL Mixed Gracy Isolated   24 1435       Final  report   10/30/23 1538    Gonorrhea  Negative  Negative 10/30/23 1515    Chlamydia  Negative  Negative 10/30/23 1515    TSH  1.860 uIU/mL 0.450 - 4.500 10/31/23 1335    HgB A1c   5.0 % 4.8 - 5.6 10/31/23 1335    Varicella IgG  625 index Immune >165 10/31/23 1335                2nd and 3rd Trimester       Test Value Reference Range Date Time    Hemoglobin (repeated)  13.4 g/dL 12.0 - 15.9 24 1124       13.1 g/dL 12.0 - 15.9 24 1005    Hematocrit (repeated)  40.0 % 34.0 - 46.6 24 1124       38.3 % 34.0 - 46.6 24 1005    Platelets   211 10*3/mm3 140 - 450 24 1124       215 10*3/mm3 140 - 450 24 1005       324 x10E3/uL 150 - 450 10/31/23 1335    GCT  131 mg/dL 65 - 139 24 1005    Antibody Screen (repeated)        3rd TM syphilis scrn (repeated)  RPR   Non Reactive  Non Reactive 24 1005    3rd TM syphilis scrn (repeated) FTA        GTT Fasting  76 mg/dL 65 - 94 24 0814    GTT 1 Hr  143 mg/dL 65 - 179 24 0814    GTT 2 Hr  143 mg/dL 65 - 154 24 0814    GTT 3 Hr  103 mg/dL 65 - 139 24 0814    Group B Strep  Negative  Negative 24 1540                Past OB History:     OB History    Para Term  AB Living   3 2 1 1 0 2   SAB IAB Ectopic Molar Multiple Live Births   0 0 0 0 0 2      # Outcome Date GA Lbr Warner/2nd Weight Sex Type Anes PTL Lv   3 Current            2  22 35w5d  2637 g (5 lb 13 oz) F CS-Unspec Spinal N NESTOR      Complications: Placenta Previa, Premature rupture of membranes      Name: edi   1 Term 21 39w4d  4120 g (9 lb 1.3 oz) M CS-LTranv EPI, Gen N NESTOR      Complications: Fetal Intolerance, Shoulder Dystocia      Name: COLEEN ROBBINS      Apgar1: 8  Apgar5: 9      Obstetric Comments   Uncomplicated del and CS/ 2024 ap       Past Medical History: Past Medical History:   Diagnosis Date    History of stomach ulcers     Hx of Placenta previa w G1 2021    Migraine       Past Surgical History  Past Surgical History:   Procedure Laterality Date     SECTION      x2    COLONOSCOPY      ESOPHAGOSCOPY      WISDOM TOOTH EXTRACTION  2016      Family History: Family History   Problem Relation Age of Onset    Breast cancer Paternal Grandmother     Breast cancer Maternal Aunt     Lung cancer Maternal Aunt     Miscarriages / Stillbirths Neg Hx     Mental retardation Neg Hx     Mental illness Neg Hx     Malig Hyperthermia Neg Hx     Learning disabilities Neg Hx     Kidney disease Neg Hx     Hypertension Neg Hx     Hyperlipidemia Neg Hx     Heart disease Neg Hx     Hearing loss Neg Hx     Early death Neg Hx     Drug abuse Neg Hx     Diabetes Neg Hx     Depression Neg Hx     COPD Neg Hx     Cancer Neg Hx     Bleeding Disorder Neg Hx     Asthma Neg Hx     Arthritis Neg Hx     Alcohol abuse Neg Hx     Birth defects Neg Hx     Ovarian cancer Neg Hx     Uterine cancer Neg Hx     Colon cancer Neg Hx     Deep vein thrombosis Neg Hx     Pulmonary embolism Neg Hx       Social History:  reports that she has never smoked. She does not have any smokeless tobacco history on file.   reports that she does not currently use alcohol.   reports no history of drug use.        General ROS: Pertinent items are noted in HPI  Home Medications:  prenatal vitamin    Allergies:  Allergies   Allergen Reactions    Fd&C Red #3 (Erythrosine Sodium) Other (See Comments)     Other reaction(s): GI Reaction   Also pink dye in soaps: fever, vomiting, hives. Pink 5    Other Hives, Itching, Other (See Comments) and Rash     Pink five -dye, oxyclean, splenda    Mouth sores from splenda    Also pink dye in soaps: fever, vomiting, hives. Pink 5    Soap & Cleansers Other (See Comments)     Skin reaction to oxy clean    Other reaction(s): Dermatologic Reaction   Skin reaction to oxy clean    Alitraq Other (See Comments)     Mouth sores from splenda    Latex Rash       Objective       Vital Signs Range for the last 24 hours  Temperature: Temp:  [98.7  °F (37.1 °C)] 98.7 °F (37.1 °C)   Temp Source: Temp src: Oral   BP: BP: (109-124)/(58-76) 114/58   Pulse: Heart Rate:  [89-94] 89   Respirations: Resp:  [18] 18   SPO2: SpO2:  [97 %] 97 %     Physical Examination:   General appearance - alert, well appearing, and in no distress  Mental status - alert, oriented to person, place, and time  Chest - normal effort; CTA  Heart - normal rate, regular rhythm  Abdomen - soft, nondistended; no rebound or guarding  Pelvic -  per RN  Neurological - alert, oriented, normal speech  Extremities - Edema none; DTR 1+  Skin - normal coloration and turgor, no suspicious skin lesions noted      Cervix: Method: sterile exam per RN   Dilation: Cervical Dilation (cm): 1   Effacement: Cervical Effacement: 50%   Station:        [place]: occasional contractions   NST: reactive NST with baseline 135; no decels/reactive    Assessment & Plan     Assessment:  -  Intrauterine pregnancy at 37w5d gestation who presents for: passing her mucous plug and occasional contractions  -  Obstetrical history significant for  two prior  sections .  -  GBS status:   Group B Strep, DNA   Date Value Ref Range Status   2024 Negative Negative Final       Plan:  - NST reactive. Rare contractions. No change in cervix from last night. Recommend return for regular contractions, leaking fluid, bleeding, decreased fetal movement.   - Plan of care has been reviewed with patient and patient agrees.   - Risks, benefits of treatment plan have been discussed.  - All questions have been answered.        Electronically signed by Linsey Dennison MD, 24, 7:56 PM EDT.

## 2024-06-03 NOTE — NON STRESS TEST
Obstetrical Non-stress Test Interpretation     Name:  Sherie Coleman  MRN: [id number]    28 y.o. female  at 37w5d    Indication: lost mucous plug       Fetal Assessment  Fetal Movement: active  Fetal HR Assessment Method: external  Fetal HR (beats/min): 135  Fetal HR Baseline: normal range  Fetal HR Variability: moderate (amplitude range 6 to 25 bpm)  Fetal HR Accelerations: greater than/equal to 15 bpm, lasting at least 15 seconds  Fetal HR Decelerations: absent  Sinusoidal Pattern Present: absent    /58 (BP Location: Right arm)   Pulse 89   Resp 18   LMP 2023   SpO2 97%     Reason for test: OB Triage (lost mucous plug)  Date of Test: 2024  Time frame of test: 4890-0101  RN NST Interpretation: Reactive    Discharge instructions explained and given to patient. Patient verbalized understanding. Left unit in stable condition with significant other.     Adrienne Griffith RN  2024  20:01 EDT

## 2024-06-04 NOTE — OBED NOTES
"LARRY Roman  Obstetric History and Physical    Chief Complaint   Patient presents with    Abdominal Pain     Lower abdominal tightening and \"feeling like a corkscrew down there\" since 8 occurring every 15 to 20 minutes. Also reports yesterday increased discharge that went away.        Subjective     Patient is a 28 y.o. female  currently at 38w0d, who presents with complaint of contractions.  She also reports some mucousy discharge.  Positive fetal movements.  She denies any vaginal bleeding.    Her prenatal care is complicated by history of prior  section.  Her previous obstetric/gynecological history is noted for is non-contributory.    The following portions of the patients history were reviewed and updated as appropriate: current medications, allergies, past medical history, past surgical history, past family history, past social history, and problem list .       Prenatal Information:  Prenatal Results       Initial Prenatal Labs       Test Value Reference Range Date Time    Hemoglobin  13.8 g/dL 11.1 - 15.9 10/31/23 133    Hematocrit  41.1 % 34.0 - 46.6 10/31/23 133    Platelets  324 x10E3/uL 150 - 450 10/31/23 1335    Rubella IgG  6.35 index Immune >0.99 10/31/23 133    Hepatitis B SAg  Negative  Negative 10/31/23 1335    Hepatitis C Ab  Non Reactive  Non Reactive 10/31/23 1335    RPR  Non Reactive  Non Reactive 24 1005       Non Reactive  Non Reactive 10/31/23 1335    T. Pallidum Ab         ABO  A   10/31/23 1335    Rh  Positive   10/31/23 133    Antibody Screen  Negative  Negative 10/31/23 133    HIV  Non Reactive  Non Reactive 10/31/23 1335    Urine Culture  25,000 CFU/mL Mixed Gracy Isolated   24 1435       Final report   10/30/23 1538    Gonorrhea  Negative  Negative 10/30/23 1515    Chlamydia  Negative  Negative 10/30/23 1515    TSH  1.860 uIU/mL 0.450 - 4.500 10/31/23 1335    HgB A1c   5.0 % 4.8 - 5.6 10/31/23 1335    Varicella IgG  625 index Immune >165 10/31/23 1335    " HgB Electrophoresis         Hemoglobinopathy (genetic testing)        Cystic fibrosis                   Fetal testing        Test Value Reference Range Date Time    NIPT        MSAFP        AFP-4                  2nd and 3rd Trimester       Test Value Reference Range Date Time    Hemoglobin (repeated)  13.4 g/dL 12.0 - 15.9 04/29/24 1124       13.1 g/dL 12.0 - 15.9 03/06/24 1005    Hematocrit (repeated)  40.0 % 34.0 - 46.6 04/29/24 1124       38.3 % 34.0 - 46.6 03/06/24 1005    Platelets   211 10*3/mm3 140 - 450 04/29/24 1124       215 10*3/mm3 140 - 450 03/06/24 1005       324 x10E3/uL 150 - 450 10/31/23 1335    1 hour GTT   131 mg/dL 65 - 139 03/06/24 1005    Antibody Screen (repeated)        3rd TM syphilis scrn (repeated)  RPR   Non Reactive  Non Reactive 03/06/24 1005    3rd TM syphilis scrn (repeated) FTA        GTT Fasting  76 mg/dL 65 - 94 03/12/24 0814    GTT 1 Hr  143 mg/dL 65 - 179 03/12/24 0814    GTT 2 Hr  143 mg/dL 65 - 154 03/12/24 0814    GTT 3 Hr  103 mg/dL 65 - 139 03/12/24 0814    Group B Strep  Negative  Negative 05/20/24 1540              Other testing        Test Value Reference Range Date Time    [place] IgG         CMV IgG                   Drug Screening       Test Value Reference Range Date Time    Amphetamine Screen        Barbiturate Screen        Benzodiazepine Screen        Methadone Screen        Phencyclidine Screen        Opiates Screen        THC Screen        Cocaine Screen        Propoxyphene Screen        Buprenorphine Screen        Methamphetamine Screen        Oxycodone Screen        Tricyclic Antidepressants Screen                  Legend    ^: Historical                          External Prenatal Results       Pregnancy Outside Results - Transcribed From Office Records - See Scanned Records For Details       Test Value Date Time    ABO  A  10/31/23 1335    Rh  Positive  10/31/23 1335    Antibody Screen  Negative  10/31/23 1335    Varicella IgG  625 index 10/31/23 1335     Rubella  6.35 index 10/31/23 1335    Hgb  13.4 g/dL 24 1124       13.1 g/dL 24 1005       13.8 g/dL 10/31/23 1335    Hct  40.0 % 24 1124       38.3 % 24 1005       41.1 % 10/31/23 1335    HgB A1c   5.0 % 10/31/23 1335    1h GTT  131 mg/dL 24 1005    3h GTT Fasting  76 mg/dL 24 0814    3h GTT 1 hour  143 mg/dL 24 0814    3h GTT 2 hour  143 mg/dL 24 0814    3h GTT 3 hour   103 mg/dL 24 0814    Gonorrhea (discrete)  Negative  10/30/23 1515    Chlamydia (discrete)  Negative  10/30/23 1515    RPR  Non Reactive  24 1005       Non Reactive  10/31/23 1335    Syphilis Antibody       HBsAg  Negative  10/31/23 1335    Herpes Simplex Virus PCR       Herpes Simplex VIrus Culture       HIV  Non Reactive  10/31/23 1335    Hep C RNA Quant PCR       Hep C Antibody  Non Reactive  10/31/23 1335    AFP       NIPT       Cystic Fibroisis        Group B Strep  Negative  24 1540    GBS Susceptibility to Clindamycin       GBS Susceptibility to Erythromycin       Fetal Fibronectin  Negative  24 1218    Genetic Testing, Maternal Blood                 Drug Screening       Test Value Date Time    Urine Drug Screen       Amphetamine Screen       Barbiturate Screen       Benzodiazepine Screen       Methadone Screen       Phencyclidine Screen       Opiates Screen       THC Screen       Cocaine Screen       Propoxyphene Screen       Buprenorphine Screen       Methamphetamine Screen       Oxycodone Screen       Tricyclic Antidepressants Screen                 Legend    ^: Historical                             Past OB History:     OB History    Para Term  AB Living   3 2 1 1 0 2   SAB IAB Ectopic Molar Multiple Live Births   0 0 0 0 0 2      # Outcome Date GA Lbr Warner/2nd Weight Sex Type Anes PTL Lv   3 Current            2  22 35w5d  2637 g (5 lb 13 oz) F CS-Unspec Spinal N NESTOR      Complications: Placenta Previa, Premature rupture of membranes       Name: edi Ashley Term 21 39w4d  4120 g (9 lb 1.3 oz) M CS-LTranv EPI, Gen N NESTOR      Complications: Fetal Intolerance, Shoulder Dystocia      Name: COLEEN ROBBINS      Apgar1: 8  Apgar5: 9      Obstetric Comments   Uncomplicated del and CS/ 2024 ap       Past Medical History: Past Medical History:   Diagnosis Date    History of stomach ulcers     Hx of Placenta previa w G1 2021    Migraine       Past Surgical History Past Surgical History:   Procedure Laterality Date     SECTION      x2    COLONOSCOPY      ESOPHAGOSCOPY      WISDOM TOOTH EXTRACTION  2016      Family History: Family History   Problem Relation Age of Onset    Breast cancer Paternal Grandmother     Breast cancer Maternal Aunt     Lung cancer Maternal Aunt     Miscarriages / Stillbirths Neg Hx     Mental retardation Neg Hx     Mental illness Neg Hx     Malig Hyperthermia Neg Hx     Learning disabilities Neg Hx     Kidney disease Neg Hx     Hypertension Neg Hx     Hyperlipidemia Neg Hx     Heart disease Neg Hx     Hearing loss Neg Hx     Early death Neg Hx     Drug abuse Neg Hx     Diabetes Neg Hx     Depression Neg Hx     COPD Neg Hx     Cancer Neg Hx     Bleeding Disorder Neg Hx     Asthma Neg Hx     Arthritis Neg Hx     Alcohol abuse Neg Hx     Birth defects Neg Hx     Ovarian cancer Neg Hx     Uterine cancer Neg Hx     Colon cancer Neg Hx     Deep vein thrombosis Neg Hx     Pulmonary embolism Neg Hx       Social History:  reports that she has never smoked. She does not have any smokeless tobacco history on file.   reports that she does not currently use alcohol.   reports no history of drug use.        General ROS: Pertinent items are noted in HPI    Objective       Vital Signs Range for the last 24 hours  Temperature:     Temp Source:     BP:     Pulse:     Respirations:     SPO2:     O2 Amount (l/min):     O2 Devices     Weight:       Physical Examination: General appearance - alert, well appearing, and in no  distress  Mental status - alert, oriented to person, place, and time  Chest - clear to auscultation, no wheezes, rales or rhonchi, symmetric air entry  Heart - normal rate, regular rhythm, normal S1, S2, no murmurs, rubs, clicks or gallops  Abdomen - soft, nontender, gravid  Skin - normal coloration and turgor, no rashes, no suspicious skin lesions noted    Presentation: Vertex   Cervix: Exam by: Method: sterile exam per RN   Dilation: Cervical Dilation (cm): 1   Effacement: Cervical Effacement: 80%   Station:         Fetal Heart Rate Assessment   Method: Fetal HR Assessment Method: external   Beats/min: Fetal HR (beats/min): 135   Baseline: Fetal HR Baseline: normal range   Variability: Fetal HR Variability: moderate (amplitude range 6 to 25 bpm)   Accels: Fetal HR Accelerations: episodic, lasting at least 15 seconds, greater than/equal to 10 bpm (32 wks gest or less)   Decels: Fetal HR Decelerations: absent         Uterine Assessment   Method:     Frequency (min):     Ctx Count in 10 min:     Duration:     Intensity:         [place] Units:       GBS is negative      Assessment & Plan     Contractions during pregnancy      Assessment:  1.  Intrauterine pregnancy at 37w0d gestation with reactive fetal status.    2.  IUP at 37 weeks estimate gestational age with false labor      Plan:  Discharge home  Labor precautions  Strict fetal kick count      Ismael Montalvo MD  6/4/2024  12:00 EDT

## 2024-06-06 ENCOUNTER — ROUTINE PRENATAL (OUTPATIENT)
Dept: OBSTETRICS AND GYNECOLOGY | Facility: CLINIC | Age: 29
End: 2024-06-06
Payer: MEDICAID

## 2024-06-06 VITALS — WEIGHT: 210 LBS | BODY MASS INDEX: 38.41 KG/M2 | SYSTOLIC BLOOD PRESSURE: 128 MMHG | DIASTOLIC BLOOD PRESSURE: 85 MMHG

## 2024-06-06 DIAGNOSIS — Z34.80 SUPERVISION OF OTHER NORMAL PREGNANCY, ANTEPARTUM: Primary | ICD-10-CM

## 2024-06-06 LAB
GLUCOSE UR STRIP-MCNC: NEGATIVE MG/DL
PROT UR STRIP-MCNC: NEGATIVE MG/DL

## 2024-06-06 NOTE — PROGRESS NOTES
OB FOLLOW UP  CC- Here for care of pregnancy        Sherie Coleman is a 28 y.o.  38w2d patient being seen today for her obstetrical follow up visit. Patient reports no complaints.     Her prenatal care is complicated by (and status) :    Patient Active Problem List   Diagnosis    History of     Supervision of other normal pregnancy, antepartum    Obesity in pregnancy, antepartum    Chronic migraine without aura without status migrainosus, not intractable    History of  delivery, currently pregnant       Flu Status:   Covid Status:    ROS -   Patient Reports : No Problems  Patient Denies: Loss of Fluid and Vaginal Spotting  Fetal Movement : normal  All other systems reviewed and are negative.       The additional following portions of the patient's history were reviewed and updated as appropriate: allergies, current medications, past family history, past medical history, past social history, past surgical history, and problem list.    I have reviewed and agree with the HPI, ROS, and historical information as entered above. Angelica Mercer, DO    /85   Wt 95.3 kg (210 lb)   LMP 2023   BMI 38.41 kg/m²       EXAM:     FHT: 143 BPM   Uterine Size:  33 cm  Pelvic Exam: No    Urine glucose/protein: See prenatal flowsheet       Assessment and Plan  Diagnoses and all orders for this visit:    1. Supervision of other normal pregnancy, antepartum (Primary)  Overview:  PAWAN finalized: 24 by LMP and 6-week ultrasound    Adria neg XY (reported by prior OB), by report CF neg prior preg    COVID: Recommended  Flu: Vaccinated   Tdap: Vaccinated    28-32 weeks repeat TPA: negative  ? Desires Sterilization: Declines    Anatomy US:24 Normal anatomy, anterior placenta, no previa, EFW:370g, 66.3%ile, AC:67%ile     PROBLEM LIST/PLAN:   Late transfer of care- @30wks  Previous C/S x2 - 2024 desires Dr Mercer  Elevated 1 hour-normal 3-hour    Orders:  -     POC Urinalysis Dipstick          Pregnancy at 38w2d  Fetal status reassuring.   Activity and Exercise discussed.  Return in about 1 week (around 6/13/2024) for thursday with me.  Kick counts bid  Labour precautions    Angelica Mercer,   06/06/2024

## 2024-06-13 ENCOUNTER — ANESTHESIA EVENT (OUTPATIENT)
Dept: LABOR AND DELIVERY | Facility: HOSPITAL | Age: 29
End: 2024-06-13
Payer: MEDICAID

## 2024-06-13 ENCOUNTER — HOSPITAL ENCOUNTER (INPATIENT)
Facility: HOSPITAL | Age: 29
LOS: 2 days | Discharge: HOME | End: 2024-06-15
Attending: OBSTETRICS & GYNECOLOGY | Admitting: OBSTETRICS & GYNECOLOGY
Payer: MEDICAID

## 2024-06-13 ENCOUNTER — ANESTHESIA (OUTPATIENT)
Dept: LABOR AND DELIVERY | Facility: HOSPITAL | Age: 29
End: 2024-06-13
Payer: MEDICAID

## 2024-06-13 ENCOUNTER — ROUTINE PRENATAL (OUTPATIENT)
Dept: OBSTETRICS AND GYNECOLOGY | Facility: CLINIC | Age: 29
End: 2024-06-13
Payer: MEDICAID

## 2024-06-13 VITALS — BODY MASS INDEX: 38.23 KG/M2 | DIASTOLIC BLOOD PRESSURE: 85 MMHG | WEIGHT: 209 LBS | SYSTOLIC BLOOD PRESSURE: 125 MMHG

## 2024-06-13 DIAGNOSIS — Z34.80 SUPERVISION OF OTHER NORMAL PREGNANCY, ANTEPARTUM: Primary | ICD-10-CM

## 2024-06-13 PROBLEM — Z37.9 NORMAL LABOR: Status: ACTIVE | Noted: 2024-06-13

## 2024-06-13 LAB
ABO GROUP BLD: NORMAL
AMPHET+METHAMPHET UR QL: NEGATIVE
ATMOSPHERIC PRESS: 741.6 MMHG
BARBITURATES UR QL SCN: NEGATIVE
BASE EXCESS BLDCOA CALC-SCNC: -3.9 MMOL/L (ref -2–2)
BENZODIAZ UR QL SCN: NEGATIVE
BILIRUB BLD-MCNC: NEGATIVE MG/DL
BLD GP AB SCN SERPL QL: NEGATIVE
CANNABINOIDS SERPL QL: NEGATIVE
CLARITY, POC: ABNORMAL
COCAINE UR QL: NEGATIVE
COLOR UR: YELLOW
DEPRECATED RDW RBC AUTO: 42.4 FL (ref 37–54)
ERYTHROCYTE [DISTWIDTH] IN BLOOD BY AUTOMATED COUNT: 14.3 % (ref 12.3–15.4)
FENTANYL UR-MCNC: NEGATIVE NG/ML
GLUCOSE UR STRIP-MCNC: NEGATIVE MG/DL
GLUCOSE UR STRIP-MCNC: NEGATIVE MG/DL
HCO3 BLDCOA-SCNC: 25.2 MMOL/L
HCT VFR BLD AUTO: 45.2 % (ref 34–46.6)
HGB BLD-MCNC: 15.4 G/DL (ref 12–15.9)
KETONES UR QL: ABNORMAL
LEUKOCYTE EST, POC: NEGATIVE
MCH RBC QN AUTO: 28.2 PG (ref 26.6–33)
MCHC RBC AUTO-ENTMCNC: 34.1 G/DL (ref 31.5–35.7)
MCV RBC AUTO: 82.8 FL (ref 79–97)
METHADONE UR QL SCN: NEGATIVE
NITRITE UR-MCNC: NEGATIVE MG/ML
OPIATES UR QL: NEGATIVE
OXYCODONE UR QL SCN: NEGATIVE
PCO2 BLDCOA: 60.6 MMHG (ref 33–49)
PH BLDCOA: 7.23 PH UNITS (ref 7.18–7.34)
PH UR: 5.5 [PH] (ref 5–8)
PLATELET # BLD AUTO: 221 10*3/MM3 (ref 140–450)
PMV BLD AUTO: 11.9 FL (ref 6–12)
PO2 BLDCOA: 15.9 MMHG
PROT UR STRIP-MCNC: ABNORMAL MG/DL
PROT UR STRIP-MCNC: NEGATIVE MG/DL
RBC # BLD AUTO: 5.46 10*6/MM3 (ref 3.77–5.28)
RBC # UR STRIP: NEGATIVE /UL
RH BLD: POSITIVE
SAO2 % BLDCOA: 15.3 %
SP GR UR: 1.03 (ref 1–1.03)
T PALLIDUM IGG SER QL: NORMAL
T&S EXPIRATION DATE: NORMAL
UROBILINOGEN UR QL: ABNORMAL
WBC NRBC COR # BLD AUTO: 13.08 10*3/MM3 (ref 3.4–10.8)

## 2024-06-13 PROCEDURE — 85027 COMPLETE CBC AUTOMATED: CPT | Performed by: OBSTETRICS & GYNECOLOGY

## 2024-06-13 PROCEDURE — 25010000002 PROPOFOL 10 MG/ML EMULSION: Performed by: NURSE ANESTHETIST, CERTIFIED REGISTERED

## 2024-06-13 PROCEDURE — 25810000003 LACTATED RINGERS SOLUTION: Performed by: OBSTETRICS & GYNECOLOGY

## 2024-06-13 PROCEDURE — 86900 BLOOD TYPING SEROLOGIC ABO: CPT | Performed by: OBSTETRICS & GYNECOLOGY

## 2024-06-13 PROCEDURE — 25010000002 BUPIVACAINE PF 0.75 % SOLUTION: Performed by: NURSE ANESTHETIST, CERTIFIED REGISTERED

## 2024-06-13 PROCEDURE — 25010000002 CEFAZOLIN PER 500 MG: Performed by: OBSTETRICS & GYNECOLOGY

## 2024-06-13 PROCEDURE — 86850 RBC ANTIBODY SCREEN: CPT | Performed by: OBSTETRICS & GYNECOLOGY

## 2024-06-13 PROCEDURE — 80307 DRUG TEST PRSMV CHEM ANLYZR: CPT | Performed by: OBSTETRICS & GYNECOLOGY

## 2024-06-13 PROCEDURE — 25010000002 MIDAZOLAM PER 1MG: Performed by: NURSE ANESTHETIST, CERTIFIED REGISTERED

## 2024-06-13 PROCEDURE — 82803 BLOOD GASES ANY COMBINATION: CPT

## 2024-06-13 PROCEDURE — 81002 URINALYSIS NONAUTO W/O SCOPE: CPT | Performed by: OBSTETRICS & GYNECOLOGY

## 2024-06-13 PROCEDURE — 86780 TREPONEMA PALLIDUM: CPT | Performed by: OBSTETRICS & GYNECOLOGY

## 2024-06-13 PROCEDURE — 25010000002 MORPHINE PER 10 MG: Performed by: NURSE ANESTHETIST, CERTIFIED REGISTERED

## 2024-06-13 PROCEDURE — 25010000002 OXYTOCIN PER 10 UNITS: Performed by: NURSE ANESTHETIST, CERTIFIED REGISTERED

## 2024-06-13 PROCEDURE — 86901 BLOOD TYPING SEROLOGIC RH(D): CPT | Performed by: OBSTETRICS & GYNECOLOGY

## 2024-06-13 PROCEDURE — 25010000002 ONDANSETRON PER 1 MG: Performed by: NURSE ANESTHETIST, CERTIFIED REGISTERED

## 2024-06-13 PROCEDURE — 25010000002 FENTANYL CITRATE (PF) 50 MCG/ML SOLUTION: Performed by: NURSE ANESTHETIST, CERTIFIED REGISTERED

## 2024-06-13 RX ORDER — CITRIC ACID/SODIUM CITRATE 334-500MG
30 SOLUTION, ORAL ORAL ONCE
Status: DISCONTINUED | OUTPATIENT
Start: 2024-06-13 | End: 2024-06-14 | Stop reason: HOSPADM

## 2024-06-13 RX ORDER — SODIUM CHLORIDE 0.9 % (FLUSH) 0.9 %
10 SYRINGE (ML) INJECTION EVERY 12 HOURS SCHEDULED
Status: CANCELLED | OUTPATIENT
Start: 2024-06-13

## 2024-06-13 RX ORDER — BUPIVACAINE HYDROCHLORIDE 7.5 MG/ML
INJECTION, SOLUTION EPIDURAL; RETROBULBAR
Status: COMPLETED | OUTPATIENT
Start: 2024-06-13 | End: 2024-06-13

## 2024-06-13 RX ORDER — TRANEXAMIC ACID 10 MG/ML
INJECTION, SOLUTION INTRAVENOUS
Status: DISCONTINUED
Start: 2024-06-13 | End: 2024-06-14 | Stop reason: WASHOUT

## 2024-06-13 RX ORDER — SODIUM CHLORIDE 9 MG/ML
40 INJECTION, SOLUTION INTRAVENOUS AS NEEDED
Status: CANCELLED | OUTPATIENT
Start: 2024-06-13

## 2024-06-13 RX ORDER — MORPHINE SULFATE 0.5 MG/ML
INJECTION, SOLUTION EPIDURAL; INTRATHECAL; INTRAVENOUS
Status: COMPLETED | OUTPATIENT
Start: 2024-06-13 | End: 2024-06-13

## 2024-06-13 RX ORDER — OXYTOCIN/0.9 % SODIUM CHLORIDE 30/500 ML
125 PLASTIC BAG, INJECTION (ML) INTRAVENOUS ONCE
Status: CANCELLED | OUTPATIENT
Start: 2024-06-13 | End: 2024-06-13

## 2024-06-13 RX ORDER — SODIUM CHLORIDE 9 MG/ML
40 INJECTION, SOLUTION INTRAVENOUS AS NEEDED
Status: DISCONTINUED | OUTPATIENT
Start: 2024-06-13 | End: 2024-06-14 | Stop reason: HOSPADM

## 2024-06-13 RX ORDER — MISOPROSTOL 200 UG/1
TABLET ORAL
Status: DISCONTINUED
Start: 2024-06-13 | End: 2024-06-14 | Stop reason: WASHOUT

## 2024-06-13 RX ORDER — PRENATAL VIT/IRON FUM/FOLIC AC 27MG-0.8MG
1 TABLET ORAL DAILY
Status: DISCONTINUED | OUTPATIENT
Start: 2024-06-14 | End: 2024-06-15 | Stop reason: HOSPADM

## 2024-06-13 RX ORDER — SODIUM CHLORIDE, SODIUM LACTATE, POTASSIUM CHLORIDE, CALCIUM CHLORIDE 600; 310; 30; 20 MG/100ML; MG/100ML; MG/100ML; MG/100ML
125 INJECTION, SOLUTION INTRAVENOUS CONTINUOUS
Status: DISCONTINUED | OUTPATIENT
Start: 2024-06-13 | End: 2024-06-15 | Stop reason: HOSPADM

## 2024-06-13 RX ORDER — MIDAZOLAM HYDROCHLORIDE 2 MG/2ML
INJECTION, SOLUTION INTRAMUSCULAR; INTRAVENOUS AS NEEDED
Status: DISCONTINUED | OUTPATIENT
Start: 2024-06-13 | End: 2024-06-14 | Stop reason: SURG

## 2024-06-13 RX ORDER — OXYTOCIN/0.9 % SODIUM CHLORIDE 30/500 ML
1 PLASTIC BAG, INJECTION (ML) INTRAVENOUS CONTINUOUS
Status: CANCELLED | OUTPATIENT
Start: 2024-06-13

## 2024-06-13 RX ORDER — FENTANYL CITRATE 50 UG/ML
INJECTION, SOLUTION INTRAMUSCULAR; INTRAVENOUS
Status: COMPLETED | OUTPATIENT
Start: 2024-06-13 | End: 2024-06-13

## 2024-06-13 RX ORDER — LIDOCAINE HYDROCHLORIDE 10 MG/ML
0.5 INJECTION, SOLUTION EPIDURAL; INFILTRATION; INTRACAUDAL; PERINEURAL ONCE AS NEEDED
Status: DISCONTINUED | OUTPATIENT
Start: 2024-06-13 | End: 2024-06-14 | Stop reason: HOSPADM

## 2024-06-13 RX ORDER — ACETAMINOPHEN 500 MG
1000 TABLET ORAL ONCE
Status: COMPLETED | OUTPATIENT
Start: 2024-06-13 | End: 2024-06-13

## 2024-06-13 RX ORDER — PHENYLEPHRINE HCL IN 0.9% NACL 1 MG/10 ML
SYRINGE (ML) INTRAVENOUS AS NEEDED
Status: DISCONTINUED | OUTPATIENT
Start: 2024-06-13 | End: 2024-06-14 | Stop reason: SURG

## 2024-06-13 RX ORDER — FAMOTIDINE 10 MG/ML
INJECTION, SOLUTION INTRAVENOUS
Status: COMPLETED
Start: 2024-06-13 | End: 2024-06-13

## 2024-06-13 RX ORDER — SODIUM CHLORIDE 0.9 % (FLUSH) 0.9 %
10 SYRINGE (ML) INJECTION EVERY 12 HOURS SCHEDULED
Status: DISCONTINUED | OUTPATIENT
Start: 2024-06-13 | End: 2024-06-14 | Stop reason: HOSPADM

## 2024-06-13 RX ORDER — SODIUM CHLORIDE 0.9 % (FLUSH) 0.9 %
10 SYRINGE (ML) INJECTION AS NEEDED
Status: DISCONTINUED | OUTPATIENT
Start: 2024-06-13 | End: 2024-06-14 | Stop reason: HOSPADM

## 2024-06-13 RX ORDER — SODIUM CHLORIDE, SODIUM LACTATE, POTASSIUM CHLORIDE, CALCIUM CHLORIDE 600; 310; 30; 20 MG/100ML; MG/100ML; MG/100ML; MG/100ML
125 INJECTION, SOLUTION INTRAVENOUS CONTINUOUS
Status: CANCELLED | OUTPATIENT
Start: 2024-06-13

## 2024-06-13 RX ORDER — SODIUM CHLORIDE 0.9 % (FLUSH) 0.9 %
10 SYRINGE (ML) INJECTION AS NEEDED
Status: CANCELLED | OUTPATIENT
Start: 2024-06-13

## 2024-06-13 RX ORDER — MISOPROSTOL 100 UG/1
200 TABLET ORAL EVERY 6 HOURS SCHEDULED
Status: CANCELLED | OUTPATIENT
Start: 2024-06-13 | End: 2024-06-14

## 2024-06-13 RX ORDER — KETOROLAC TROMETHAMINE 15 MG/ML
30 INJECTION, SOLUTION INTRAMUSCULAR; INTRAVENOUS ONCE
Status: CANCELLED | OUTPATIENT
Start: 2024-06-13 | End: 2024-06-13

## 2024-06-13 RX ORDER — PROPOFOL 10 MG/ML
VIAL (ML) INTRAVENOUS AS NEEDED
Status: DISCONTINUED | OUTPATIENT
Start: 2024-06-13 | End: 2024-06-14 | Stop reason: SURG

## 2024-06-13 RX ORDER — CARBOPROST TROMETHAMINE 250 UG/ML
INJECTION, SOLUTION INTRAMUSCULAR
Status: DISCONTINUED
Start: 2024-06-13 | End: 2024-06-14 | Stop reason: WASHOUT

## 2024-06-13 RX ORDER — ONDANSETRON 2 MG/ML
INJECTION INTRAMUSCULAR; INTRAVENOUS AS NEEDED
Status: DISCONTINUED | OUTPATIENT
Start: 2024-06-13 | End: 2024-06-14 | Stop reason: SURG

## 2024-06-13 RX ORDER — LIDOCAINE HYDROCHLORIDE 10 MG/ML
0.5 INJECTION, SOLUTION EPIDURAL; INFILTRATION; INTRACAUDAL; PERINEURAL ONCE AS NEEDED
Status: CANCELLED | OUTPATIENT
Start: 2024-06-13

## 2024-06-13 RX ORDER — METHYLERGONOVINE MALEATE 0.2 MG/ML
INJECTION INTRAVENOUS
Status: DISCONTINUED
Start: 2024-06-13 | End: 2024-06-14 | Stop reason: WASHOUT

## 2024-06-13 RX ORDER — OXYTOCIN 10 [USP'U]/ML
INJECTION, SOLUTION INTRAMUSCULAR; INTRAVENOUS AS NEEDED
Status: DISCONTINUED | OUTPATIENT
Start: 2024-06-13 | End: 2024-06-14 | Stop reason: SURG

## 2024-06-13 RX ORDER — CITRIC ACID/SODIUM CITRATE 334-500MG
30 SOLUTION, ORAL ORAL ONCE
Status: CANCELLED | OUTPATIENT
Start: 2024-06-13 | End: 2024-06-13

## 2024-06-13 RX ORDER — ACETAMINOPHEN 500 MG
1000 TABLET ORAL ONCE
Status: CANCELLED | OUTPATIENT
Start: 2024-06-13 | End: 2024-06-13

## 2024-06-13 RX ADMIN — SODIUM CHLORIDE, POTASSIUM CHLORIDE, SODIUM LACTATE AND CALCIUM CHLORIDE 1000 ML: 600; 310; 30; 20 INJECTION, SOLUTION INTRAVENOUS at 22:30

## 2024-06-13 RX ADMIN — ACETAMINOPHEN 1000 MG: 500 TABLET ORAL at 22:55

## 2024-06-13 RX ADMIN — PROPOFOL 30 MG: 10 INJECTION, EMULSION INTRAVENOUS at 23:44

## 2024-06-13 RX ADMIN — CEFAZOLIN 2 G: 2 INJECTION, POWDER, FOR SOLUTION INTRAVENOUS at 22:54

## 2024-06-13 RX ADMIN — PROPOFOL 30 MG: 10 INJECTION, EMULSION INTRAVENOUS at 23:53

## 2024-06-13 RX ADMIN — OXYTOCIN 40 UNITS: 10 INJECTION, SOLUTION INTRAMUSCULAR; INTRAVENOUS at 23:32

## 2024-06-13 RX ADMIN — FAMOTIDINE 20 MG: 10 INJECTION INTRAVENOUS at 23:01

## 2024-06-13 RX ADMIN — ONDANSETRON HYDROCHLORIDE 4 MG: 2 SOLUTION INTRAMUSCULAR; INTRAVENOUS at 23:36

## 2024-06-13 RX ADMIN — Medication 100 MCG: at 23:14

## 2024-06-13 RX ADMIN — PROPOFOL 30 MG: 10 INJECTION, EMULSION INTRAVENOUS at 23:56

## 2024-06-13 RX ADMIN — SODIUM CHLORIDE, POTASSIUM CHLORIDE, SODIUM LACTATE AND CALCIUM CHLORIDE 1000 ML: 600; 310; 30; 20 INJECTION, SOLUTION INTRAVENOUS at 22:06

## 2024-06-13 RX ADMIN — PROPOFOL 30 MG: 10 INJECTION, EMULSION INTRAVENOUS at 23:47

## 2024-06-13 RX ADMIN — PROPOFOL 30 MG: 10 INJECTION, EMULSION INTRAVENOUS at 23:50

## 2024-06-13 RX ADMIN — PROPOFOL 30 MG: 10 INJECTION, EMULSION INTRAVENOUS at 23:42

## 2024-06-13 RX ADMIN — MORPHINE SULFATE 0.15 MG: 0.5 INJECTION, SOLUTION EPIDURAL; INTRATHECAL; INTRAVENOUS at 23:08

## 2024-06-13 RX ADMIN — FENTANYL CITRATE 15 MCG: 50 INJECTION, SOLUTION INTRAMUSCULAR; INTRAVENOUS at 23:05

## 2024-06-13 RX ADMIN — BUPIVACAINE HYDROCHLORIDE 1.2 ML: 7.5 INJECTION, SOLUTION EPIDURAL; RETROBULBAR at 23:08

## 2024-06-13 RX ADMIN — PROPOFOL 20 MG: 10 INJECTION, EMULSION INTRAVENOUS at 23:38

## 2024-06-13 RX ADMIN — PROPOFOL 30 MG: 10 INJECTION, EMULSION INTRAVENOUS at 23:34

## 2024-06-13 RX ADMIN — MIDAZOLAM HYDROCHLORIDE 2 MG: 1 INJECTION, SOLUTION INTRAMUSCULAR; INTRAVENOUS at 23:34

## 2024-06-13 NOTE — PROGRESS NOTES
OB FOLLOW UP  CC- Here for care of pregnancy        Sherie Coleman is a 28 y.o.  39w2d patient being seen today for her obstetrical follow up visit. Patient reports occasional contractions.     Her prenatal care is complicated by (and status) :    Patient Active Problem List   Diagnosis    History of     Supervision of other normal pregnancy, antepartum    Obesity in pregnancy, antepartum    Chronic migraine without aura without status migrainosus, not intractable    History of  delivery, currently pregnant       Flu Status:   Covid Status:    ROS -   Patient Reports : No Problems  Patient Denies: Loss of Fluid and Vaginal Spotting  Fetal Movement : normal  All other systems reviewed and are negative.       The additional following portions of the patient's history were reviewed and updated as appropriate: allergies, current medications, past family history, past medical history, past social history, past surgical history, and problem list.    I have reviewed and agree with the HPI, ROS, and historical information as entered above. Angelica Mercer, DO    /85   Wt 94.8 kg (209 lb)   LMP 2023   BMI 38.23 kg/m²       EXAM:     FHT: 133 BPM   Uterine Size:  37 cm  Pelvic Exam: Yes.  Presentation: cephalic. Dilation: FT. Effacement: 50%. Station: -2.mid/med    Urine glucose/protein: See prenatal flowsheet       Assessment and Plan  Diagnoses and all orders for this visit:    1. Supervision of other normal pregnancy, antepartum (Primary)  Overview:  PAWAN finalized: 24 by LMP and 6-week ultrasound    Adria neg XY (reported by prior OB), by report CF neg prior preg    COVID: Recommended  Flu: Vaccinated   Tdap: Vaccinated    28-32 weeks repeat TPA: negative  ? Desires Sterilization: Declines    Anatomy US:24 Normal anatomy, anterior placenta, no previa, EFW:370g, 66.3%ile, AC:67%ile     PROBLEM LIST/PLAN:   Late transfer of care- @30wks  Previous C/S x2 - 2024 desires  Dr Mercer  Elevated 1 hour-normal 3-hour    Orders:  -     POC Urinalysis Dipstick    Other orders  -     Vital Signs q 4 while awake; Standing  -     Vital Signs Per Hospital Policy; Standing  -     Continuous Fetal Monitoring With NST on Admission and Prior to Initiation of Oxytocin.; Standing  -     External Uterine Contraction Monitoring; Standing  -     Notify Provider (Specified); Standing  -     Notify Provider of Tachysystole (Per Hospital Algorithm); Standing  -     Notify Provider if Membranes Ruptured, Bleeding Greater Than 1 Pad Per Hour, Fetal Heart Tone Abnormality or Severe Pain; Standing  -     Bed Rest With Bathroom Privileges; Standing  -     Initiate Group Beta Strep (GBS) Prophylaxis Protocol, If Criteria Met; Standing  -     Insert Indwelling Urinary Catheter; Standing  -     Assess Need for Indwelling Urinary Catheter - Follow Removal Protocol; Standing  -     Urinary Catheter Care; Standing  -     Abdominal Prep With Clippers; Standing  -     Chlorhexadine Skin Prep Unless Otherwise Indicated; Standing  -     SCD (Sequential Compression Devices); Standing  -     NPO Diet NPO Type: Ice Chips; Standing  -     Inpatient Anesthesiology Consult; Standing  -     Type & Screen; Standing  -     CBC (No Diff); Standing  -     T Pallidum Antibody w/ reflex RPR (Syphilis); Standing  -     Urine Drug Screen - Urine, Clean Catch; Standing  -     Insert Peripheral IV; Standing  -     Saline Lock & Maintain IV Access; Standing  -     sodium chloride 0.9 % flush 10 mL  -     sodium chloride 0.9 % flush 10 mL  -     sodium chloride 0.9 % infusion 40 mL  -     lidocaine PF 1% (XYLOCAINE) injection 0.5 mL  -     lactated ringers bolus 1,000 mL  -     lactated ringers infusion  -     Sod Citrate-Citric Acid (BICITRA) oral solution 30 mL  -     acetaminophen (TYLENOL) tablet 1,000 mg  -     Notify Provider (Specified); Standing  -     Vital Signs Per Hospital Policy; Standing  -     Continuous Pulse Oximetry;  Standing  -     Strict Bed Rest; Standing  -     Intake & Output; Standing  -     Fundal & Lochia Check; Standing  -     Fundal & Lochia Check; Standing  -     Diet: Regular/House; Fluid Consistency: Thin (IDDSI 0); Standing  -     Advance Diet As Tolerated -Regular Diet; Standing  -     Blood Gas, Arterial, Cord; Standing  -     If indicated -- Please administer RH Immunoglobulin based on results of cord blood evaluation and fetal screen lab tests, pharmacy to dispense; Standing  -     oxytocin (PITOCIN) 30 units in 0.9% sodium chloride 500 mL (premix)  -     oxytocin (PITOCIN) 30 units in 0.9% sodium chloride 500 mL (premix)  -     lactated ringers infusion  -     ketorolac (TORADOL) injection 30 mg  -     miSOPROStol (CYTOTEC) tablet 200 mcg  -     Admit To Obstetrics Inpatient; Standing  -     Code Status and Medical Interventions:; Standing  -     ceFAZolin (ANCEF) 2 g in sodium chloride 0.9 % 100 mL IVPB         Pregnancy at 39w2d  Fetal status reassuring.   Activity and Exercise discussed.  Return in about 11 days (around 6/24/2024).  CS in am  Kick counts bid  Labor precautions    Angelica Mercer DO  06/13/2024

## 2024-06-14 PROBLEM — Z34.80 SUPERVISION OF OTHER NORMAL PREGNANCY, ANTEPARTUM: Status: RESOLVED | Noted: 2023-10-30 | Resolved: 2024-06-14

## 2024-06-14 PROBLEM — O99.210 OBESITY IN PREGNANCY, ANTEPARTUM: Status: RESOLVED | Noted: 2023-10-30 | Resolved: 2024-06-14

## 2024-06-14 PROBLEM — O09.899 HISTORY OF PRETERM DELIVERY, CURRENTLY PREGNANT: Status: RESOLVED | Noted: 2024-04-11 | Resolved: 2024-06-14

## 2024-06-14 LAB
ABO GROUP BLD: NORMAL
ATMOSPHERIC PRESS: 740.8 MMHG
BASE EXCESS BLDV CALC-SCNC: -3.2 MMOL/L (ref -2–2)
HCO3 BLDV-SCNC: 23.8 MMOL/L (ref 22–26)
HGB BLDA-MCNC: 16.2 G/DL (ref 12–18)
INHALED O2 CONCENTRATION: 21 %
MODALITY: ABNORMAL
NOTIFIED WHO: ABNORMAL
PCO2 BLDV: 48.6 MM HG (ref 41–51)
PH BLDV: 7.3 PH UNITS (ref 7.31–7.41)
PO2 BLDV: <18.1 MM HG (ref 35–42)
RH BLD: POSITIVE
SAO2 % BLDCOV: 15.2 % (ref 45–75)

## 2024-06-14 PROCEDURE — 25010000002 KETOROLAC TROMETHAMINE PER 15 MG: Performed by: OBSTETRICS & GYNECOLOGY

## 2024-06-14 PROCEDURE — 86900 BLOOD TYPING SEROLOGIC ABO: CPT

## 2024-06-14 PROCEDURE — 86901 BLOOD TYPING SEROLOGIC RH(D): CPT

## 2024-06-14 PROCEDURE — 25010000002 DIPHENHYDRAMINE PER 50 MG: Performed by: NURSE ANESTHETIST, CERTIFIED REGISTERED

## 2024-06-14 PROCEDURE — 25010000002 HYDROMORPHONE PER 4 MG: Performed by: NURSE ANESTHETIST, CERTIFIED REGISTERED

## 2024-06-14 PROCEDURE — 63710000001 DIPHENHYDRAMINE PER 50 MG: Performed by: NURSE ANESTHETIST, CERTIFIED REGISTERED

## 2024-06-14 RX ORDER — OXYTOCIN/0.9 % SODIUM CHLORIDE 30/500 ML
125 PLASTIC BAG, INJECTION (ML) INTRAVENOUS ONCE AS NEEDED
Status: DISCONTINUED | OUTPATIENT
Start: 2024-06-14 | End: 2024-06-15 | Stop reason: HOSPADM

## 2024-06-14 RX ORDER — HYDROMORPHONE HYDROCHLORIDE 2 MG/ML
0.25 INJECTION, SOLUTION INTRAMUSCULAR; INTRAVENOUS; SUBCUTANEOUS
Status: ACTIVE | OUTPATIENT
Start: 2024-06-14 | End: 2024-06-15

## 2024-06-14 RX ORDER — HYDROMORPHONE HYDROCHLORIDE 2 MG/ML
0.5 INJECTION, SOLUTION INTRAMUSCULAR; INTRAVENOUS; SUBCUTANEOUS
Status: DISCONTINUED | OUTPATIENT
Start: 2024-06-14 | End: 2024-06-15 | Stop reason: HOSPADM

## 2024-06-14 RX ORDER — DIPHENHYDRAMINE HYDROCHLORIDE 50 MG/ML
12.5 INJECTION INTRAMUSCULAR; INTRAVENOUS EVERY 6 HOURS PRN
Status: DISPENSED | OUTPATIENT
Start: 2024-06-14 | End: 2024-06-15

## 2024-06-14 RX ORDER — OXYTOCIN/0.9 % SODIUM CHLORIDE 30/500 ML
1 PLASTIC BAG, INJECTION (ML) INTRAVENOUS CONTINUOUS
Status: DISCONTINUED | OUTPATIENT
Start: 2024-06-14 | End: 2024-06-15 | Stop reason: HOSPADM

## 2024-06-14 RX ORDER — ACETAMINOPHEN 325 MG/1
650 TABLET ORAL EVERY 6 HOURS
Status: DISCONTINUED | OUTPATIENT
Start: 2024-06-14 | End: 2024-06-15 | Stop reason: HOSPADM

## 2024-06-14 RX ORDER — DIPHENHYDRAMINE HYDROCHLORIDE 50 MG/ML
12.5 INJECTION INTRAMUSCULAR; INTRAVENOUS EVERY 6 HOURS PRN
Status: ACTIVE | OUTPATIENT
Start: 2024-06-14 | End: 2024-06-15

## 2024-06-14 RX ORDER — OXYCODONE HYDROCHLORIDE 5 MG/1
10 TABLET ORAL EVERY 4 HOURS PRN
Status: DISCONTINUED | OUTPATIENT
Start: 2024-06-14 | End: 2024-06-15 | Stop reason: HOSPADM

## 2024-06-14 RX ORDER — SENNOSIDES A AND B 8.6 MG/1
1 TABLET, FILM COATED ORAL NIGHTLY
Status: DISCONTINUED | OUTPATIENT
Start: 2024-06-14 | End: 2024-06-15 | Stop reason: HOSPADM

## 2024-06-14 RX ORDER — HYDROMORPHONE HYDROCHLORIDE 2 MG/ML
0.25 INJECTION, SOLUTION INTRAMUSCULAR; INTRAVENOUS; SUBCUTANEOUS
Status: DISCONTINUED | OUTPATIENT
Start: 2024-06-14 | End: 2024-06-15 | Stop reason: HOSPADM

## 2024-06-14 RX ORDER — DIPHENHYDRAMINE HCL 25 MG
25 CAPSULE ORAL EVERY 6 HOURS PRN
Status: DISPENSED | OUTPATIENT
Start: 2024-06-14 | End: 2024-06-15

## 2024-06-14 RX ORDER — MISOPROSTOL 200 UG/1
200 TABLET ORAL EVERY 6 HOURS SCHEDULED
Status: DISCONTINUED | OUTPATIENT
Start: 2024-06-14 | End: 2024-06-14 | Stop reason: HOSPADM

## 2024-06-14 RX ORDER — OXYCODONE HYDROCHLORIDE 5 MG/1
5 TABLET ORAL EVERY 4 HOURS PRN
Status: DISCONTINUED | OUTPATIENT
Start: 2024-06-14 | End: 2024-06-15 | Stop reason: HOSPADM

## 2024-06-14 RX ORDER — OXYCODONE HYDROCHLORIDE 5 MG/1
5 TABLET ORAL
Status: DISCONTINUED | OUTPATIENT
Start: 2024-06-14 | End: 2024-06-15 | Stop reason: HOSPADM

## 2024-06-14 RX ORDER — SODIUM CHLORIDE, SODIUM LACTATE, POTASSIUM CHLORIDE, CALCIUM CHLORIDE 600; 310; 30; 20 MG/100ML; MG/100ML; MG/100ML; MG/100ML
125 INJECTION, SOLUTION INTRAVENOUS CONTINUOUS
Status: DISCONTINUED | OUTPATIENT
Start: 2024-06-14 | End: 2024-06-15 | Stop reason: HOSPADM

## 2024-06-14 RX ORDER — SIMETHICONE 80 MG
80 TABLET,CHEWABLE ORAL 4 TIMES DAILY PRN
Status: DISCONTINUED | OUTPATIENT
Start: 2024-06-14 | End: 2024-06-15 | Stop reason: HOSPADM

## 2024-06-14 RX ORDER — KETOROLAC TROMETHAMINE 30 MG/ML
15 INJECTION, SOLUTION INTRAMUSCULAR; INTRAVENOUS EVERY 6 HOURS
Status: COMPLETED | OUTPATIENT
Start: 2024-06-14 | End: 2024-06-15

## 2024-06-14 RX ORDER — ENOXAPARIN SODIUM 100 MG/ML
40 INJECTION SUBCUTANEOUS NIGHTLY
Status: DISCONTINUED | OUTPATIENT
Start: 2024-06-15 | End: 2024-06-15 | Stop reason: HOSPADM

## 2024-06-14 RX ORDER — OXYTOCIN/0.9 % SODIUM CHLORIDE 30/500 ML
125 PLASTIC BAG, INJECTION (ML) INTRAVENOUS ONCE
Status: COMPLETED | OUTPATIENT
Start: 2024-06-14 | End: 2024-06-14

## 2024-06-14 RX ORDER — KETOROLAC TROMETHAMINE 30 MG/ML
30 INJECTION, SOLUTION INTRAMUSCULAR; INTRAVENOUS ONCE
Status: COMPLETED | OUTPATIENT
Start: 2024-06-14 | End: 2024-06-14

## 2024-06-14 RX ORDER — IBUPROFEN 600 MG/1
600 TABLET ORAL EVERY 6 HOURS
Status: DISCONTINUED | OUTPATIENT
Start: 2024-06-15 | End: 2024-06-15 | Stop reason: HOSPADM

## 2024-06-14 RX ORDER — ONDANSETRON 4 MG/1
4 TABLET, ORALLY DISINTEGRATING ORAL EVERY 8 HOURS PRN
Status: DISCONTINUED | OUTPATIENT
Start: 2024-06-14 | End: 2024-06-15 | Stop reason: HOSPADM

## 2024-06-14 RX ORDER — MEPERIDINE HYDROCHLORIDE 25 MG/ML
12.5 INJECTION INTRAMUSCULAR; INTRAVENOUS; SUBCUTANEOUS
Status: DISCONTINUED | OUTPATIENT
Start: 2024-06-14 | End: 2024-06-15 | Stop reason: HOSPADM

## 2024-06-14 RX ORDER — NALOXONE HCL 0.4 MG/ML
0.4 VIAL (ML) INJECTION ONCE AS NEEDED
Status: ACTIVE | OUTPATIENT
Start: 2024-06-14 | End: 2024-06-15

## 2024-06-14 RX ADMIN — VITAMIN A, VITAMIN C, VITAMIN D, VITAMIN E, THIAMINE, RIBOFLAVIN, NIACIN, VITAMIN B6, FOLIC ACID, VITAMIN B12, CALCIUM, IRON, ZINC, COPPER 1 TABLET: 4000; 120; 400; 22; 1.84; 3; 20; 10; 1; 12; 200; 27; 25; 2 TABLET ORAL at 08:07

## 2024-06-14 RX ADMIN — OXYCODONE HYDROCHLORIDE 10 MG: 5 TABLET ORAL at 17:38

## 2024-06-14 RX ADMIN — KETOROLAC TROMETHAMINE 15 MG: 30 INJECTION, SOLUTION INTRAMUSCULAR; INTRAVENOUS at 13:16

## 2024-06-14 RX ADMIN — Medication 125 ML/HR: at 02:21

## 2024-06-14 RX ADMIN — HYDROCORTISONE 1 APPLICATION: 25 CREAM TOPICAL at 20:39

## 2024-06-14 RX ADMIN — DIPHENHYDRAMINE HYDROCHLORIDE 12.5 MG: 50 INJECTION, SOLUTION INTRAMUSCULAR; INTRAVENOUS at 02:02

## 2024-06-14 RX ADMIN — DIPHENHYDRAMINE HYDROCHLORIDE 25 MG: 25 CAPSULE ORAL at 08:07

## 2024-06-14 RX ADMIN — SENNOSIDES 1 TABLET: 8.6 TABLET, FILM COATED ORAL at 21:25

## 2024-06-14 RX ADMIN — HYDROMORPHONE HYDROCHLORIDE 0.5 MG: 2 INJECTION, SOLUTION INTRAMUSCULAR; INTRAVENOUS; SUBCUTANEOUS at 00:32

## 2024-06-14 RX ADMIN — KETOROLAC TROMETHAMINE 15 MG: 30 INJECTION, SOLUTION INTRAMUSCULAR; INTRAVENOUS at 06:37

## 2024-06-14 RX ADMIN — KETOROLAC TROMETHAMINE 15 MG: 30 INJECTION, SOLUTION INTRAMUSCULAR; INTRAVENOUS at 18:30

## 2024-06-14 RX ADMIN — DIPHENHYDRAMINE HYDROCHLORIDE 25 MG: 25 CAPSULE ORAL at 15:56

## 2024-06-14 RX ADMIN — ACETAMINOPHEN 650 MG: 325 TABLET ORAL at 21:25

## 2024-06-14 RX ADMIN — KETOROLAC TROMETHAMINE 30 MG: 30 INJECTION, SOLUTION INTRAMUSCULAR; INTRAVENOUS at 01:37

## 2024-06-14 RX ADMIN — OXYCODONE HYDROCHLORIDE 10 MG: 5 TABLET ORAL at 08:08

## 2024-06-14 RX ADMIN — MISOPROSTOL 200 MCG: 200 TABLET ORAL at 02:23

## 2024-06-14 RX ADMIN — OXYCODONE HYDROCHLORIDE 5 MG: 5 TABLET ORAL at 03:41

## 2024-06-14 NOTE — ANESTHESIA POSTPROCEDURE EVALUATION
Patient: Sherie Coleman    Procedure Summary       Date: 24 Room / Location: [facility] LABOR DELIVERY  [facility] LABOR DELIVERY    Anesthesia Start:  Anesthesia Stop: 24    Procedure:  SECTION REPEAT (Abdomen) Diagnosis:     Surgeons: Munira Brady MD Provider: Sheila Watson CRNA    Anesthesia Type: spinal ASA Status: 2            Anesthesia Type: spinal    Vitals  Vitals Value Taken Time   /73 24 0446   Temp 36.9 °C (98.4 °F) 24 0446   Pulse 109 24 0446   Resp 18 24 0446   SpO2 96 % 24 0215           Post Anesthesia Care and Evaluation    Patient location during evaluation: bedside  Patient participation: complete - patient participated  Level of consciousness: awake  Pain management: adequate    Airway patency: patent  Anesthetic complications: No anesthetic complications  PONV Status: controlled  Cardiovascular status: acceptable and stable  Respiratory status: acceptable  Hydration status: acceptable  Post Neuraxial Block status: Motor and sensory function returned to baseline and No signs or symptoms of PDPH

## 2024-06-14 NOTE — PROGRESS NOTES
LARRY Roman   PROGRESS NOTE    Post-Op Day 1 S/P   Subjective   Subjective  Patient reports:  Pain is well controlled with prescription NSAID's and narcotic analgesics.  She is ambulating. Tolerating diet. Tolerating po -- normal for liquids and decreased for solids.  Intake -- c/o of tolerating po liquids.   Voiding -  urinary catheter remains in place ; flatus reported..  Vaginal bleeding is as much as expected.    Objective    Objective:  Vital signs (most recent): Blood pressure 121/73, pulse 109, temperature 98.4 °F (36.9 °C), temperature source Oral, resp. rate 18, last menstrual period 2023, SpO2 96%, currently breastfeeding.       Vitals: Vital Signs Range for the last 24 hours  Temperature: Temp:  [98.4 °F (36.9 °C)-98.5 °F (36.9 °C)] 98.4 °F (36.9 °C)   Temp Source: Temp src: Oral   BP: BP: (104-129)/(49-92) 121/73   Pulse: Heart Rate:  [] 109   Respirations: Resp:  [14-18] 18   SPO2: SpO2:  [96 %-97 %] 96 %   O2 Amount (l/min):     O2 Devices Device (Oxygen Therapy): room air   Weight: Weight:  [94.8 kg (209 lb)] 94.8 kg (209 lb)            Physical Exam    Lungs clear to auscultation bilaterally   Abdomen Soft, no unexpected tenderness. Bowel sounds present.    Incision  Clean, dry and intact.   Extremities Normal, atraumatic, no cyanosis.      I reviewed the patient's new clinical results.    Assessment & Plan        Normal labor    Postpartum care following  delivery    Care and examination of lactating mother    Assessment & Plan    Assessment:    Sherie Coleman is Day 1  post-partum  , Low Transverse   .      Plan:  remove dressing, remove urine catheter, saline lock IV fluids, advance diet  normal diet as tolerated, continue post op care, and plan for discharge tomorrow.      Munira Brady MD  24  08:02 EDT

## 2024-06-14 NOTE — PLAN OF CARE
Problem: Adult Inpatient Plan of Care  Goal: Plan of Care Review  Outcome: Ongoing, Progressing  Flowsheets (Taken 6/14/2024 0513)  Progress: improving  Plan of Care Reviewed With: patient  Goal: Patient-Specific Goal (Individualized)  Outcome: Ongoing, Progressing  Goal: Absence of Hospital-Acquired Illness or Injury  Outcome: Ongoing, Progressing  Intervention: Prevent and Manage VTE (Venous Thromboembolism) Risk  Recent Flowsheet Documentation  Taken 6/14/2024 0500 by Sherin Montalvo, RN  Activity Management: ambulated to bathroom  Taken 6/14/2024 0030 by Sherin Montalvo, RN  VTE Prevention/Management: sequential compression devices on  Goal: Optimal Comfort and Wellbeing  Outcome: Ongoing, Progressing  Goal: Readiness for Transition of Care  Outcome: Ongoing, Progressing  Intervention: Mutually Develop Transition Plan  Recent Flowsheet Documentation  Taken 6/13/2024 2235 by Sherin Montalvo, RN  Equipment Currently Used at Home: none   Goal Outcome Evaluation:  Plan of Care Reviewed With: patient        Progress: improving

## 2024-06-14 NOTE — ANESTHESIA PREPROCEDURE EVALUATION
Anesthesia Evaluation     no history of anesthetic complications:   NPO Solid Status: > 4 hours  NPO Liquid Status: > 4 hours           Airway   Mallampati: II  TM distance: >3 FB  Neck ROM: full  No difficulty expected  Dental - normal exam     Pulmonary - negative pulmonary ROS and normal exam   Cardiovascular - normal exam  Exercise tolerance: good (4-7 METS)      ROS comment: Currently blood pressure up but has not been with entire pregnancy     Neuro/Psych  (+) headaches  GI/Hepatic/Renal/Endo    (+) obesity, GERD    Musculoskeletal (-) negative ROS    Abdominal  - normal exam   Substance History - negative use     OB/GYN    (+) Pregnant        Other - negative ROS                   Anesthesia Plan    ASA 2     spinal       Anesthetic plan, risks, benefits, and alternatives have been provided, discussed and informed consent has been obtained with: patient.  Pre-procedure education provided  Plan discussed with CRNA.    CODE STATUS:    Code Status (Patient has no pulse and is not breathing): CPR (Attempt to Resuscitate)  Medical Interventions (Patient has pulse or is breathing): Full

## 2024-06-14 NOTE — LACTATION NOTE
LC in to see this P3 patient. She  her other children. LC observed her latch baby to the left and baby did not settle in well so she switched to the right breast and baby immediately latched. She states baby is favoring the right breast. LC reminded her about the football hold and encouraged her to attempt this to help with latch to the right breast.

## 2024-06-14 NOTE — H&P
LARRY Roman  Obstetric History and Physical    Chief Complaint   Patient presents with    Contractions       Subjective     Patient is a 28 y.o. female  currently at 39w2d, who presents with complaint of painful contractions.  She reports contractions have been ongoing since last night but worsened prior to presentation.  She was seen in office by Dr. Mercer and she was fingertip.  She has a history of 2 prior  section and desires elective repeat  section.  On labor and delivery she was noted to have irregular contractions and cervix is 3 to 4 cm 90% effaced.  Plan is to proceed with repeat  section tonight.    [place]  [facility] [place] is [place].  Her previous obstetric/gynecological history is noted for is non-contributory.    The following portions of the patients history were reviewed and updated as appropriate: current medications, allergies, past medical history, past surgical history, past family history, past social history, and problem list .       Prenatal Information:  Prenatal Results       Initial Prenatal Labs       Test Value Reference Range Date Time    Hemoglobin  13.8 g/dL 11.1 - 15.9 10/31/23 1335    Hematocrit  41.1 % 34.0 - 46.6 10/31/23 1335    Platelets  324 x10E3/uL 150 - 450 10/31/23 1335    Rubella IgG  6.35 index Immune >0.99 10/31/23 1335    Hepatitis B SAg  Negative  Negative 10/31/23 1335    Hepatitis C Ab  Non Reactive  Non Reactive 10/31/23 1335    RPR  Non Reactive  Non Reactive 24 1005       Non Reactive  Non Reactive 10/31/23 1335    T. Pallidum Ab         ABO  A   10/31/23 1335    Rh  Positive   10/31/23 1335    Antibody Screen  Negative  Negative 10/31/23 1335    HIV  Non Reactive  Non Reactive 10/31/23 1335    Urine Culture  25,000 CFU/mL Mixed Gracy Isolated   24 1435       Final report   10/30/23 1538    Gonorrhea  Negative  Negative 10/30/23 1515    Chlamydia  Negative  Negative 10/30/23 1515    TSH  1.860 uIU/mL  0.450 - 4.500 10/31/23 1335    HgB A1c   5.0 % 4.8 - 5.6 10/31/23 1335    Varicella IgG  625 index Immune >165 10/31/23 1335    HgB Electrophoresis         Hemoglobinopathy (genetic testing)        Cystic fibrosis                   Fetal testing        Test Value Reference Range Date Time    NIPT        MSAFP        AFP-4                  2nd and 3rd Trimester       Test Value Reference Range Date Time    Hemoglobin (repeated)  13.4 g/dL 12.0 - 15.9 04/29/24 1124       13.1 g/dL 12.0 - 15.9 03/06/24 1005    Hematocrit (repeated)  40.0 % 34.0 - 46.6 04/29/24 1124       38.3 % 34.0 - 46.6 03/06/24 1005    Platelets   211 10*3/mm3 140 - 450 04/29/24 1124       215 10*3/mm3 140 - 450 03/06/24 1005       324 x10E3/uL 150 - 450 10/31/23 1335    1 hour GTT   131 mg/dL 65 - 139 03/06/24 1005    Antibody Screen (repeated)        3rd TM syphilis scrn (repeated)  RPR   Non Reactive  Non Reactive 03/06/24 1005    3rd TM syphilis scrn (repeated) FTA        GTT Fasting  76 mg/dL 65 - 94 03/12/24 0814    GTT 1 Hr  143 mg/dL 65 - 179 03/12/24 0814    GTT 2 Hr  143 mg/dL 65 - 154 03/12/24 0814    GTT 3 Hr  103 mg/dL 65 - 139 03/12/24 0814    Group B Strep  Negative  Negative 05/20/24 1540              Other testing        Test Value Reference Range Date Time    Parvo IgG         CMV IgG                   Drug Screening       Test Value Reference Range Date Time    Amphetamine Screen        Barbiturate Screen        Benzodiazepine Screen        Methadone Screen        Phencyclidine Screen        Opiates Screen        THC Screen        Cocaine Screen        Propoxyphene Screen        Buprenorphine Screen        Methamphetamine Screen        Oxycodone Screen        Tricyclic Antidepressants Screen                  Legend    ^: Historical                          External Prenatal Results       Pregnancy Outside Results - Transcribed From Office Records - See Scanned Records For Details       Test Value Date Time    ABO  A  10/31/23  1335    Rh  Positive  10/31/23 1335    Antibody Screen  Negative  10/31/23 1335    Varicella IgG  625 index 10/31/23 1335    Rubella  6.35 index 10/31/23 1335    Hgb  13.4 g/dL 24 1124       13.1 g/dL 24 1005       13.8 g/dL 10/31/23 1335    Hct  40.0 % 24 1124       38.3 % 24 1005       41.1 % 10/31/23 1335    HgB A1c   5.0 % 10/31/23 1335    1h GTT  131 mg/dL 24 1005    3h GTT Fasting  76 mg/dL 24 0814    3h GTT 1 hour  143 mg/dL 24 0814    3h GTT 2 hour  143 mg/dL 24 0814    3h GTT 3 hour   103 mg/dL 24 0814    Gonorrhea (discrete)  Negative  10/30/23 1515    Chlamydia (discrete)  Negative  10/30/23 1515    RPR  Non Reactive  24 1005       Non Reactive  10/31/23 1335    Syphilis Antibody       HBsAg  Negative  10/31/23 1335    Herpes Simplex Virus PCR       Herpes Simplex VIrus Culture       HIV  Non Reactive  10/31/23 1335    Hep C RNA Quant PCR       Hep C Antibody  Non Reactive  10/31/23 1335    AFP       NIPT       Cystic Fibroisis        Group B Strep  Negative  24 1540    GBS Susceptibility to Clindamycin       GBS Susceptibility to Erythromycin       Fetal Fibronectin  Negative  24 1218    Genetic Testing, Maternal Blood                 Drug Screening       Test Value Date Time    Urine Drug Screen       Amphetamine Screen       Barbiturate Screen       Benzodiazepine Screen       Methadone Screen       Phencyclidine Screen       Opiates Screen       THC Screen       Cocaine Screen       Propoxyphene Screen       Buprenorphine Screen       Methamphetamine Screen       Oxycodone Screen       Tricyclic Antidepressants Screen                 Legend    ^: Historical                             Past OB History:     OB History    Para Term  AB Living   3 2 1 1 0 2   SAB IAB Ectopic Molar Multiple Live Births   0 0 0 0 0 2      # Outcome Date GA Lbr Warner/2nd Weight Sex Type Anes PTL Lv   3 Current            2  22  35w5d  2637 g (5 lb 13 oz) F CS-Unspec Spinal N NESTOR      Complications: Placenta Previa, Premature rupture of membranes      Name: edi   1 Term 21 39w4d  4120 g (9 lb 1.3 oz) M CS-LTranv EPI, Gen N NESTOR      Complications: Fetal Intolerance, Shoulder Dystocia      Name: COLEEN ROBBINS      Apgar1: 8  Apgar5: 9      Obstetric Comments   Uncomplicated del and CS/ 2024 ap       Past Medical History: Past Medical History:   Diagnosis Date    History of stomach ulcers     Hx of Placenta previa w G1 2021    Migraine       Past Surgical History Past Surgical History:   Procedure Laterality Date     SECTION      x2    COLONOSCOPY      ESOPHAGOSCOPY      WISDOM TOOTH EXTRACTION        Family History: Family History   Problem Relation Age of Onset    Breast cancer Paternal Grandmother     Breast cancer Maternal Aunt     Lung cancer Maternal Aunt     Miscarriages / Stillbirths Neg Hx     Mental retardation Neg Hx     Mental illness Neg Hx     Malig Hyperthermia Neg Hx     Learning disabilities Neg Hx     Kidney disease Neg Hx     Hypertension Neg Hx     Hyperlipidemia Neg Hx     Heart disease Neg Hx     Hearing loss Neg Hx     Early death Neg Hx     Drug abuse Neg Hx     Diabetes Neg Hx     Depression Neg Hx     COPD Neg Hx     Cancer Neg Hx     Bleeding Disorder Neg Hx     Asthma Neg Hx     Arthritis Neg Hx     Alcohol abuse Neg Hx     Birth defects Neg Hx     Ovarian cancer Neg Hx     Uterine cancer Neg Hx     Colon cancer Neg Hx     Deep vein thrombosis Neg Hx     Pulmonary embolism Neg Hx       Social History:  reports that she has never smoked. She does not have any smokeless tobacco history on file.   reports that she does not currently use alcohol.   reports no history of drug use.        General ROS: Pertinent items are noted in HPI    Objective       Vital Signs Range for the last 24 hours  Temperature:     Temp Source:     BP: BP: (125-129)/(85-87) 129/87   Pulse: Heart Rate:  [83] 83    Respirations: Resp:  [18] 18   SPO2:     O2 Amount (l/min):     O2 Devices     Weight: Weight:  [94.8 kg (209 lb)] 94.8 kg (209 lb)     Physical Examination: General appearance - alert, well appearing, and in no distress  Mental status - alert, oriented to person, place, and time  Chest - clear to auscultation, no wheezes, rales or rhonchi, symmetric air entry  Heart - normal rate, regular rhythm, normal S1, S2, no murmurs, rubs, clicks or gallops  Abdomen - soft, nontender, gravid  Extremities - peripheral pulses normal, no pedal edema, no clubbing or cyanosis  Skin - normal coloration and turgor, no rashes, no suspicious skin lesions noted    Presentation:    Cervix: Exam by:     Dilation: 3 to 4 cm dilated   Effacement: 90% effaced   Station: -1       Fetal Heart Rate Assessment   Method: Fetal HR Assessment Method: external   Beats/min: Fetal HR (beats/min): 125   Baseline: Fetal HR Baseline: normal range   Variability: Fetal HR Variability: moderate (amplitude range 6 to 25 bpm)   Accels: Fetal HR Accelerations: episodic, greater than/equal to 15 bpm, lasting at least 15 seconds   Decels: Fetal HR Decelerations: absent         Uterine Assessment   Method: Method: palpation, external tocotransducer   Frequency (min): Contraction Frequency (Minutes): 2-6   Ctx Count in 10 min:     Duration:     Intensity: Contraction Intensity: moderate by palpation       Lisle Units:       GBS is negative      Assessment & Plan       Normal labor        Assessment:  1.  Intrauterine pregnancy at 39w2d gestation with reactive fetal status.    2.  labor  without ROM  3.  Obstetrical history significant for is non-contributory.  4.  GBS status:   Group B Strep, DNA   Date Value Ref Range Status   2024 Negative Negative Final       Plan:  1. Repeat  scheduled  2. Plan of care has been reviewed with patient and patient agrees.   3.  Risks, benefits of treatment plan have been discussed.  4.  All questions have  been answered.        Ismael Montalvo MD  6/13/2024  22:34 EDT

## 2024-06-14 NOTE — ANESTHESIA PROCEDURE NOTES
Spinal Block    Pre-sedation assessment completed: 6/13/2024 11:05 PM    Patient reassessed immediately prior to procedure    Patient location during procedure: OB  Start Time: 6/13/2024 11:05 PM  Stop Time: 6/13/2024 11:16 PM  Indication:at surgeon's request, post-op pain management and procedure for pain  Performed By  CRNA/CAA: Sheila Watson, CRNA  Preanesthetic Checklist  Completed: patient identified, IV checked, site marked, risks and benefits discussed, surgical consent, monitors and equipment checked, pre-op evaluation and timeout performed  Spinal Block Prep:  Patient Position:sitting  Sterile Tech:cap, gloves, mask and sterile barriers  Prep:Betadine  Patient Monitoring:blood pressure monitoring, continuous pulse oximetry and EKG    Spinal Block Procedure  Approach:midline  Guidance:landmark technique and palpation technique  Location:L3-L4  Needle Type:Pencan  Needle Gauge:24 G  Placement of Spinal needle event:cerebrospinal fluid aspirated  Paresthesia: transient and right  Fluid Appearance:clear  Medications: bupivacaine PF (MARCAINE) injection 0.75% - Intrathecal   1.2 mL - 6/13/2024 11:08:00 PM  fentaNYL (SUBLIMAZE) injection - Intrathecal   15 mcg - 6/13/2024 11:05:00 PM  morphine PF (DURAMORPH) injection - Intrathecal   0.15 mg - 6/13/2024 11:08:00 PM   Post Assessment  Patient Tolerance:patient tolerated the procedure well with no apparent complications  Complications no

## 2024-06-14 NOTE — NURSING NOTE
, 39 2/7 weeks, presented with complaints of contractions becoming more regular and intense in the last couple hours. States she was seen in the office this afternoon and was dilated a fingertip. Denies any leaking or bleeding. States fetal movement positive. Admitted to recovery room 1 for evaluation and monitoring. External monitors applied, abdomen soft non-tender to palpation. SVE 2-3/80/-2, irregular contractions palpate moderate.V/S WNL.   Dr Montalvo notified of the above, orders received.

## 2024-06-14 NOTE — OP NOTE
" Roman   Section Operative Note    Pre-Operative Dx:   1.  IUP at Gestational Age: 39w2d  weeks    2.  Previous  delivery x 2; labor       Postoperative dx:    1.  Same     Procedure: Procedure(s):   SECTION REPEAT   Surgeon/Assistant: Surgeon(s):  Munira Brady MD Kahleifeh, Basim, MD            Anesthesia:  Anesthesiologist: Choice  CRNA: Sheila Watson CRNA     EBL: 600  mls.          IV Fluids: 600 mls.   UOP: 100 mls.    I/O this shift:  In: -   Out: 700 [Urine:100; Blood:600]   Antibiotics: cefazolin (Ancef)     Infant:      Name:  Preet       Gender: male  infant    Weight: 3710 g (8 lb 2.9 oz)     Apgars: 9  @ 1 minute /     9  @ 5 minutes    Cord gases: Venous:  No results found for: \"PHCVEN\", \"BECVEN\"     Arterial:    pH, Cord Arterial   Date Value Ref Range Status   2024 7.23 7.18 - 7.34 pH Units Final     Base Exc, Cord Arterial   Date Value Ref Range Status   2024 -3.9 (L) -2.0 - 2.0 mmol/L Final     Comment:     Serial Number: [id number]:  [id number]        Indication for C/Section:           Priority for C/Section:  routine      Procedure Details:  The appropriate consents were obtained.  Fetal monitoring was carried out.  Shave prep performed.  The patient was transported to the operating suite.  The appropriate anesthesia was administered as noted above.  The patient was placed in supine position with a left lateral tilt.  The patient had abdominal and vaginal prep.  The indwelling Dupont catheter was secured; and the patient draped in the usual sterile fashion.  Level of anesthetic was assessed and deemed adequate.  Pfannenstiel incision was made sharply taken down to the fascia; the fascia was scored and dissected in a sharp and blunt fashion.  The recti were  bluntly.  The peritoneum identified, tented and entered sharply.  The orientation and rotation of the uterus assessed.  The retractor was placed.  The bladder blade was placed.  The " bladder flap developed.  The low segment transverse uterine incision was made sharply.  It was then extended in a blunt fashion in a cephalocaudad direction.  The amniotic cavity was entered bluntly.  The infant was delivered onto the operative field in a controlled fashion.  The infant was bulb suctioned.  The umbilical cord was clamped in 2 places and cut in between.  The infant was given to the nursery nurses in attendance.  Samples of blood were collected for blood gas analysis and Ollie testing.  The placenta was expressed intact with a three-vessel cord and trailing membranes.  The uterus was exteriorized.  The uterus was cleansed with a dry lap.  The bladder blade was replaced.  The cut edges of the uterus were grasped with T and Allis clamps.  The uterine incision was reapproximated with 0 Vicryl in a continuous running fashion.  This was followed by 0 Vicryl in imbricating fashion.  Once hemostasis was deemed adequate the pelvis was copiously irrigated clot and debris removed.  The uterus was replaced in the pelvic cavity.  The retractor was removed.  The uterine incision reinspected.  The recti were then brought together with 0 Vicryl figure of 8.  The fascia was closed with 0 EDS in a continuous running fashion.  Subcutaneous tissues were irrigated.  The dead space was reapproximated with 2-0 chromic gut in fcontinuous technique.  The skin was reapproximated with 3-0 Vicryl in a subcuticular fashion.  The uterus was expressed of its contents.  Urine was noted to be clear and yellow at the conclusion of the case.  The infant was transported to nursery in satisfactory condition.  Patient was awaiting transport to the PACU, also in stable condition.  Specimens were prepared to be taken to labs as indicated.         Complications:   None      Disposition:   Mother to Mother Baby/Postpartum  in stable condition currently.   Baby to NBN  in stable condition currently.       Munira Brady,  MD  6/14/2024  00:20 EDT

## 2024-06-15 VITALS
DIASTOLIC BLOOD PRESSURE: 56 MMHG | OXYGEN SATURATION: 96 % | SYSTOLIC BLOOD PRESSURE: 105 MMHG | HEART RATE: 79 BPM | TEMPERATURE: 97.9 F | RESPIRATION RATE: 16 BRPM

## 2024-06-15 LAB
BASOPHILS # BLD AUTO: 0.02 10*3/MM3 (ref 0–0.2)
BASOPHILS NFR BLD AUTO: 0.2 % (ref 0–1.5)
DEPRECATED RDW RBC AUTO: 44.7 FL (ref 37–54)
EOSINOPHIL # BLD AUTO: 0.05 10*3/MM3 (ref 0–0.4)
EOSINOPHIL NFR BLD AUTO: 0.5 % (ref 0.3–6.2)
ERYTHROCYTE [DISTWIDTH] IN BLOOD BY AUTOMATED COUNT: 14.5 % (ref 12.3–15.4)
HCT VFR BLD AUTO: 36.4 % (ref 34–46.6)
HGB BLD-MCNC: 12.1 G/DL (ref 12–15.9)
IMM GRANULOCYTES # BLD AUTO: 0.03 10*3/MM3 (ref 0–0.05)
IMM GRANULOCYTES NFR BLD AUTO: 0.3 % (ref 0–0.5)
LYMPHOCYTES # BLD AUTO: 2.74 10*3/MM3 (ref 0.7–3.1)
LYMPHOCYTES NFR BLD AUTO: 26.5 % (ref 19.6–45.3)
MCH RBC QN AUTO: 28.4 PG (ref 26.6–33)
MCHC RBC AUTO-ENTMCNC: 33.2 G/DL (ref 31.5–35.7)
MCV RBC AUTO: 85.4 FL (ref 79–97)
MONOCYTES # BLD AUTO: 0.83 10*3/MM3 (ref 0.1–0.9)
MONOCYTES NFR BLD AUTO: 8 % (ref 5–12)
NEUTROPHILS NFR BLD AUTO: 6.67 10*3/MM3 (ref 1.7–7)
NEUTROPHILS NFR BLD AUTO: 64.5 % (ref 42.7–76)
NRBC BLD AUTO-RTO: 0 /100 WBC (ref 0–0.2)
PLATELET # BLD AUTO: 153 10*3/MM3 (ref 140–450)
PMV BLD AUTO: 11.7 FL (ref 6–12)
RBC # BLD AUTO: 4.26 10*6/MM3 (ref 3.77–5.28)
WBC NRBC COR # BLD AUTO: 10.34 10*3/MM3 (ref 3.4–10.8)

## 2024-06-15 PROCEDURE — 85025 COMPLETE CBC W/AUTO DIFF WBC: CPT | Performed by: OBSTETRICS & GYNECOLOGY

## 2024-06-15 PROCEDURE — 25010000002 KETOROLAC TROMETHAMINE PER 15 MG: Performed by: OBSTETRICS & GYNECOLOGY

## 2024-06-15 PROCEDURE — 25010000002 ENOXAPARIN PER 10 MG: Performed by: OBSTETRICS & GYNECOLOGY

## 2024-06-15 RX ORDER — ACETAMINOPHEN 325 MG/1
650 TABLET ORAL EVERY 4 HOURS PRN
Qty: 20 TABLET | Refills: 0 | Status: SHIPPED | OUTPATIENT
Start: 2024-06-15

## 2024-06-15 RX ORDER — OXYCODONE HYDROCHLORIDE 5 MG/1
5 TABLET ORAL EVERY 4 HOURS PRN
Qty: 10 TABLET | Refills: 0 | Status: SHIPPED | OUTPATIENT
Start: 2024-06-15

## 2024-06-15 RX ORDER — IBUPROFEN 600 MG/1
600 TABLET ORAL EVERY 6 HOURS PRN
Qty: 30 TABLET | Refills: 0 | Status: SHIPPED | OUTPATIENT
Start: 2024-06-15

## 2024-06-15 RX ADMIN — ACETAMINOPHEN 650 MG: 325 TABLET ORAL at 15:41

## 2024-06-15 RX ADMIN — ACETAMINOPHEN 650 MG: 325 TABLET ORAL at 09:19

## 2024-06-15 RX ADMIN — IBUPROFEN 600 MG: 600 TABLET, FILM COATED ORAL at 12:44

## 2024-06-15 RX ADMIN — SIMETHICONE 80 MG: 80 TABLET, CHEWABLE ORAL at 09:19

## 2024-06-15 RX ADMIN — VITAMIN A, VITAMIN C, VITAMIN D, VITAMIN E, THIAMINE, RIBOFLAVIN, NIACIN, VITAMIN B6, FOLIC ACID, VITAMIN B12, CALCIUM, IRON, ZINC, COPPER 1 TABLET: 4000; 120; 400; 22; 1.84; 3; 20; 10; 1; 12; 200; 27; 25; 2 TABLET ORAL at 09:19

## 2024-06-15 RX ADMIN — OXYCODONE HYDROCHLORIDE 5 MG: 5 TABLET ORAL at 06:32

## 2024-06-15 RX ADMIN — SIMETHICONE 80 MG: 80 TABLET, CHEWABLE ORAL at 00:48

## 2024-06-15 RX ADMIN — KETOROLAC TROMETHAMINE 15 MG: 30 INJECTION, SOLUTION INTRAMUSCULAR; INTRAVENOUS at 00:36

## 2024-06-15 RX ADMIN — IBUPROFEN 600 MG: 600 TABLET, FILM COATED ORAL at 07:17

## 2024-06-15 RX ADMIN — HYDROCORTISONE 1 APPLICATION: 25 CREAM TOPICAL at 09:22

## 2024-06-15 RX ADMIN — ENOXAPARIN SODIUM 40 MG: 100 INJECTION SUBCUTANEOUS at 00:36

## 2024-06-15 RX ADMIN — ACETAMINOPHEN 650 MG: 325 TABLET ORAL at 03:29

## 2024-06-15 RX ADMIN — OXYCODONE HYDROCHLORIDE 5 MG: 5 TABLET ORAL at 12:46

## 2024-06-15 NOTE — PLAN OF CARE
Problem: Adult Inpatient Plan of Care  Goal: Plan of Care Review  Outcome: Ongoing, Progressing  Goal: Patient-Specific Goal (Individualized)  Outcome: Ongoing, Progressing  Goal: Absence of Hospital-Acquired Illness or Injury  Outcome: Ongoing, Progressing  Intervention: Identify and Manage Fall Risk  Recent Flowsheet Documentation  Taken 6/15/2024 0130 by Cielo Hutton RN  Safety Promotion/Fall Prevention: safety round/check completed  Taken 2024 2336 by Cielo Hutton RN  Safety Promotion/Fall Prevention: safety round/check completed  Taken 2024 2125 by Cielo Hutton RN  Safety Promotion/Fall Prevention: safety round/check completed  Taken 2024 by Cielo Hutton RN  Safety Promotion/Fall Prevention: safety round/check completed  Intervention: Prevent Skin Injury  Recent Flowsheet Documentation  Taken 2024 by Cielo Hutton RN  Body Position: position changed independently  Intervention: Prevent and Manage VTE (Venous Thromboembolism) Risk  Recent Flowsheet Documentation  Taken 2024 by Cielo Hutton RN  Activity Management: up ad casey  Goal: Optimal Comfort and Wellbeing  Outcome: Ongoing, Progressing  Intervention: Monitor Pain and Promote Comfort  Recent Flowsheet Documentation  Taken 2024 by Cielo Hutton RN  Pain Management Interventions:   care clustered   pain management plan reviewed with patient/caregiver   see MAR   quiet environment facilitated  Intervention: Provide Person-Centered Care  Recent Flowsheet Documentation  Taken 2024 by Cielo Hutton RN  Trust Relationship/Rapport:   care explained   choices provided   questions encouraged   questions answered   thoughts/feelings acknowledged  Goal: Readiness for Transition of Care  Outcome: Ongoing, Progressing     Problem: Adjustment to Role Transition (Postpartum  Delivery)  Goal: Successful Maternal Role Transition  Outcome: Ongoing, Progressing  Intervention: Support Maternal  Role Transition  Recent Flowsheet Documentation  Taken 2024 by Cielo Hutton RN  Supportive Measures: self-care encouraged  Parent/Child Attachment Promotion:   caring behavior modeled   interaction encouraged   parent/caregiver presence encouraged   strengths emphasized     Problem: Bleeding (Postpartum  Delivery)  Goal: Hemostasis  Outcome: Ongoing, Progressing     Problem: Infection (Postpartum  Delivery)  Goal: Absence of Infection Signs and Symptoms  Outcome: Ongoing, Progressing     Problem: Pain (Postpartum  Delivery)  Goal: Acceptable Pain Control  Outcome: Ongoing, Progressing  Intervention: Prevent or Manage Pain  Recent Flowsheet Documentation  Taken 2024 by Cielo Hutton RN  Pain Management Interventions:   care clustered   pain management plan reviewed with patient/caregiver   see MAR   quiet environment facilitated     Problem: Postoperative Nausea and Vomiting (Postpartum  Delivery)  Goal: Nausea and Vomiting Relief  Outcome: Ongoing, Progressing     Problem: Postoperative Urinary Retention (Postpartum  Delivery)  Goal: Effective Urinary Elimination  Outcome: Ongoing, Progressing  Intervention: Monitor and Manage Urinary Retention  Recent Flowsheet Documentation  Taken 2024 by Cielo Hutton RN  Urinary Elimination Promotion: frequent voiding encouraged     Problem: Breastfeeding  Goal: Effective Breastfeeding  Outcome: Ongoing, Progressing  Intervention: Support Exclusive Breastfeeding Success  Recent Flowsheet Documentation  Taken 2024 by Cielo Hutton RN  Supportive Measures: self-care encouraged  Intervention: Promote Effective Breastfeeding  Recent Flowsheet Documentation  Taken 2024 by Cielo Hutton RN  Parent/Child Attachment Promotion:   caring behavior modeled   interaction encouraged   parent/caregiver presence encouraged   strengths emphasized   Goal Outcome Evaluation:      Ambulating and  urinating well, continuing to monitor pain level and bleeding, incision and VS WNL, progressing towards care plan goals. MAJO RN

## 2024-06-15 NOTE — DISCHARGE SUMMARY
Roman  Delivery Discharge Summary    Primary OB Clinician:     EDC: Estimated Date of Delivery: 24    Admitting Diagnosis:  Previous  section [Z98.891]    Discharge Diagnosis:  Same as Admitting plus:   Pregnancy at 39w2d - Delivered     Antepartum complications: none    Date of Delivery: 2024   Time of Delivery: 11:30 PM     Delivered By:  Munira Brady     Delivery Type: , Low Transverse      Tubal Ligation: delete    Baby:male  infant;   Apgar:  9  @ 1 minute /   Apgar:  9  @ 5 minutes   Weight: 3710 g (8 lb 2.9 oz)    Length: 20.5     Anesthesia: Spinal  n    Intrapartum complications: None    Laceration: No    Episiotomy: No    Placenta: Expressed     Feeding method: Breastfeeding Status: Yes    Rh Immune globulin given: no    Rubella vaccine given: no     Roman   PROGRESS NOTE    Post-Op Day 2 S/P   Subjective     Patient reports:  Pain is  controlled with prescribed medication.  She is  ambulating. Tolerating diet. Tolerating po -- normal.  Intake -- c/o of tolerating po solids and tolerating po liquids.   Voiding - without difficulty; flatus reported..  Vaginal bleeding is as much as expected.      Objective      Vitals: Vital Signs Range for the last 24 hours  Temperature: Temp:  [97.7 °F (36.5 °C)-98.3 °F (36.8 °C)] 97.7 °F (36.5 °C)   Temp Source: Temp src: Oral   BP: BP: (107-120)/(57-73) 120/73   Pulse: Heart Rate:  [72-99] 72   Respirations: Resp:  [18-20] 18   SPO2:     O2 Amount (l/min):     O2 Devices Device (Oxygen Therapy): room air   Weight:              Physical Exam    Lungs clear to auscultation bilaterally   Abdomen Bowel sounds present; fundus firm and nontender   Incision  healing well, well approximated   Extremities edema trace     Hospital course: Admitted in labor.  Underwent a repeat  delivery without complication.  She has met the expected milestones.  She is tolerating p.o. foods and fluids, voiding and passing gas.  She  has changed to bottlefeeding.  Discharge instructions provided.  She should call if she has evidence of infection, excessive bleeding or depression.  Maintain pelvic rest.  She will follow-up with her providers in a week.  Discharge Date: 6/15/2024; Discharge Time: 10:10 EDT        Plan:      Follow-up appointment with Dr. Mercer in 1 week    Electronically signed by Munira Brady MD, 06/15/24, 10:10 AM EDT.

## 2024-06-18 RX ORDER — CEPHALEXIN 500 MG/1
500 CAPSULE ORAL 2 TIMES DAILY
Qty: 14 CAPSULE | Refills: 0 | Status: SHIPPED | OUTPATIENT
Start: 2024-06-18

## 2024-06-18 RX ORDER — HYDROXYZINE HYDROCHLORIDE 25 MG/1
25 TABLET, FILM COATED ORAL 3 TIMES DAILY PRN
Qty: 30 TABLET | Refills: 1 | Status: SHIPPED | OUTPATIENT
Start: 2024-06-18

## 2024-06-22 ENCOUNTER — TELEPHONE (OUTPATIENT)
Dept: LACTATION | Facility: HOSPITAL | Age: 29
End: 2024-06-22
Payer: MEDICAID

## 2024-06-28 NOTE — PROGRESS NOTES
"Post Delivery EARLY Follow up (1-2weeks)        Chief Complaint   Patient presents with    Postpartum Care     Date of delivery: 24  Delivery type:   LTCS  Perineum : Intact  Feeding: Breast    HPI:     HA:  no  Vision changes:  no  RUQ/epigastric pain:  no  Swelling:  no    Pain:  no  Vaginal Bleeding:  yes, spotting     Depressed/Anxious:  no  EPDS score: 0   #10: 0    Weight at last OB OV: 209 LB        PHYSICAL EXAM:  /77 (BP Location: Left arm, Patient Position: Sitting, Cuff Size: Adult)   Pulse 84   Ht 157.5 cm (62\")   Wt 86.2 kg (190 lb)   LMP 2023   Breastfeeding Yes   BMI 34.75 kg/m²  14.5 kg (32 lb)  General- NAD, alert and oriented, appropriate  Psych- Normal mood, good memory, good eye contact        INCISION : Clean, dry, intact, no evidence of infection  Abdomen- Soft, non distended, non tender, no masses       Extremities- No edema    Discharge Summary by Munira Brady MD (06/15/2024 10:10)      ASSESSMENT AND PLAN:  Diagnoses and all orders for this visit:    1. Postpartum care following  delivery (Primary)      Counseling:   Return to office for HA unrelieved w rest/hydration/OTC meds, vision changes, increase in edema, RUQ/epigastric pain, any concerns.  Postpartum/Postop  precautions reviewed.  Post-op/Postpartum laceration pain, bleeding, infection precautions.  Incisional care reviewed, keep clean and dry.      Follow Up:  Return in about 3 weeks (around 2024).            Angelica Mercer DO  2024    [facility] [place] [facility]  [facility] [place] [address]  Dept: [phone]  Dept Fax: [phone]  Loc: [phone]  Loc Fax: [phone]   "

## 2024-07-01 ENCOUNTER — POSTPARTUM VISIT (OUTPATIENT)
Dept: OBSTETRICS AND GYNECOLOGY | Facility: CLINIC | Age: 29
End: 2024-07-01
Payer: MEDICAID

## 2024-07-01 VITALS
DIASTOLIC BLOOD PRESSURE: 77 MMHG | HEIGHT: 62 IN | HEART RATE: 84 BPM | WEIGHT: 190 LBS | SYSTOLIC BLOOD PRESSURE: 113 MMHG | BODY MASS INDEX: 34.96 KG/M2

## 2024-07-01 PROCEDURE — 99213 OFFICE O/P EST LOW 20 MIN: CPT | Performed by: OBSTETRICS & GYNECOLOGY

## 2024-07-24 NOTE — PROGRESS NOTES
"POSTPARTUM Follow Up Visit      Chief Complaint   Patient presents with    Postpartum Care     HPI:    Date of delivery: 24  Delivery type:   LTCS          Perineum : Intact  Delivering Provider:   Dr. Munira Brady        Feeding: Bottle   Pain:  yes, lower back pain   Vaginal Bleeding:  no  Plans for BC:  Birth control pills    Depressed/Anxious:  no  EPDS score: 0  #10: 0    Weight at last OB OV:  209 LB   Last pap date and result: No records         PHYSICAL EXAM:  /78   Pulse 90   Ht 157.5 cm (62\")   Wt 86.6 kg (191 lb)   LMP 2023   Breastfeeding No   BMI 34.93 kg/m²  14.5 kg (32 lb)chaperone present  General- NAD, alert and oriented, appropriate  Psych- Normal mood, good memory, good eye contact    INCISION : Clean, dry, intact, no evidence of infection  Abdomen- Soft, non distended, non tender, no masses     Chaperone present during pelvic exam.  External genitalia- Normal.    Urethra/Bladder/Vagina- Normal, no masses, non-tender       Cervix- Normal, no lesions, no discharge, no CMT  Uterus- Normal size, shape & consistency.  Non tender, mobile.  Adnexa- Normal, no mass, non-tender    Lymphatic- No palpable groin nodes  Extremities- No edema    Discharge Summary by Munira Brady MD (06/15/2024 10:10)    ASSESSMENT AND PLAN:  Diagnoses and all orders for this visit:    1. Postpartum care following  delivery (Primary)    2. Encounter for initial prescription of contraceptive pills  -     norgestimate-ethinyl estradiol (ORTHO TRI-CYCLEN,TRINESSA) 0.18/0.215/0.25 MG-35 MCG per tablet; Take 1 tablet by mouth Daily.  Dispense: 84 tablet; Refill: 1      Counseling:    All birth control options reviewed in detail.  R/B/A/SE/E of each wrt pts PMHx and prior BC use  Ok to resume intercourse  May resume intercourse once 4 weeks postpartum  May resume normal activities  Core strengthening exercises reviewed and recommended  Kegel exercises reviewed and " recommended        Follow Up:  No follow-ups on file.          Angelica Mercer DO  07/25/2024    [facility] [place] [facility]  [address] [place] [address]  Dept: [phone]  Dept Fax: [phone]  Loc: [phone]  Loc Fax: [phone]

## 2024-07-25 ENCOUNTER — POSTPARTUM VISIT (OUTPATIENT)
Dept: OBSTETRICS AND GYNECOLOGY | Facility: CLINIC | Age: 29
End: 2024-07-25
Payer: MEDICAID

## 2024-07-25 VITALS
DIASTOLIC BLOOD PRESSURE: 78 MMHG | SYSTOLIC BLOOD PRESSURE: 115 MMHG | BODY MASS INDEX: 35.15 KG/M2 | HEART RATE: 90 BPM | HEIGHT: 62 IN | WEIGHT: 191 LBS

## 2024-07-25 DIAGNOSIS — Z30.011 ENCOUNTER FOR INITIAL PRESCRIPTION OF CONTRACEPTIVE PILLS: ICD-10-CM

## 2024-07-25 RX ORDER — NORGESTIMATE AND ETHINYL ESTRADIOL 7DAYSX3 28
1 KIT ORAL DAILY
Qty: 84 TABLET | Refills: 1 | Status: SHIPPED | OUTPATIENT
Start: 2024-07-25 | End: 2025-07-25

## (undated) DEVICE — C SECTION PACK: Brand: MEDLINE INDUSTRIES, INC.

## (undated) DEVICE — NEEDLE,18GX1.5",REG,BEVEL: Brand: MEDLINE

## (undated) DEVICE — VIOLET BRAIDED (POLYGLACTIN 910), SYNTHETIC ABSORBABLE SUTURE: Brand: COATED VICRYL

## (undated) DEVICE — CVR HNDL LT SURG ACCSSRY BLU STRL

## (undated) DEVICE — INTENDED FOR TISSUE SEPARATION, AND OTHER PROCEDURES THAT REQUIRE A SHARP SURGICAL BLADE TO PUNCTURE OR CUT.: Brand: BARD-PARKER ® CARBON RIB-BACK BLADES

## (undated) DEVICE — DEV TRANSF BLD W/LUER ADPT CA/198

## (undated) DEVICE — PAD GRND REM POLYHESIVE A/ DISP

## (undated) DEVICE — ANTIBACTERIAL VIOLET BRAIDED (POLYGLACTIN 910), SYNTHETIC ABSORBABLE SUTURE: Brand: COATED VICRYL

## (undated) DEVICE — SUTURE GUT CHROMIC 3/0 912H

## (undated) DEVICE — SUT VICRYL 3/0 J663H

## (undated) DEVICE — TRY CATH FOL ADVANCE SIL W/BAG 16F

## (undated) DEVICE — Device: Brand: PORTEX

## (undated) DEVICE — GLV SURG BIOGEL LTX PF 7

## (undated) DEVICE — 3M™ STERI-STRIP™ BLEND TONE SKIN CLOSURES, B1557, TAN, 1/2 IN X 4 IN (12MM X 100MM), 6 STRIPS/ENVELOPE: Brand: 3M™ STERI-STRIP™